# Patient Record
Sex: FEMALE | Race: WHITE | NOT HISPANIC OR LATINO
[De-identification: names, ages, dates, MRNs, and addresses within clinical notes are randomized per-mention and may not be internally consistent; named-entity substitution may affect disease eponyms.]

---

## 2017-06-12 ENCOUNTER — APPOINTMENT (OUTPATIENT)
Dept: OBGYN | Facility: CLINIC | Age: 32
End: 2017-06-12

## 2017-06-12 VITALS
SYSTOLIC BLOOD PRESSURE: 110 MMHG | DIASTOLIC BLOOD PRESSURE: 68 MMHG | HEIGHT: 68 IN | WEIGHT: 174.38 LBS | BODY MASS INDEX: 26.43 KG/M2

## 2017-06-12 DIAGNOSIS — N91.2 AMENORRHEA, UNSPECIFIED: ICD-10-CM

## 2017-06-12 DIAGNOSIS — O02.1 MISSED ABORTION: ICD-10-CM

## 2017-06-12 DIAGNOSIS — Z87.891 PERSONAL HISTORY OF NICOTINE DEPENDENCE: ICD-10-CM

## 2017-06-12 DIAGNOSIS — Z78.9 OTHER SPECIFIED HEALTH STATUS: ICD-10-CM

## 2017-06-13 ENCOUNTER — RX RENEWAL (OUTPATIENT)
Age: 32
End: 2017-06-13

## 2017-06-13 LAB
HCG UR QL: POSITIVE
QUALITY CONTROL: YES

## 2017-06-14 ENCOUNTER — MESSAGE (OUTPATIENT)
Age: 32
End: 2017-06-14

## 2017-06-14 LAB
HCG SERPL-MCNC: 8759 MIU/ML
PROGEST SERPL-MCNC: 8.8 NG/ML

## 2017-06-18 ENCOUNTER — EMERGENCY (EMERGENCY)
Facility: HOSPITAL | Age: 32
LOS: 0 days | Discharge: HOME | End: 2017-06-18
Attending: EMERGENCY MEDICINE | Admitting: EMERGENCY MEDICINE
Payer: COMMERCIAL

## 2017-06-18 VITALS — WEIGHT: 169.98 LBS | HEIGHT: 68 IN

## 2017-06-18 VITALS
RESPIRATION RATE: 19 BRPM | SYSTOLIC BLOOD PRESSURE: 110 MMHG | HEART RATE: 86 BPM | OXYGEN SATURATION: 99 % | DIASTOLIC BLOOD PRESSURE: 77 MMHG

## 2017-06-18 DIAGNOSIS — O20.0 THREATENED ABORTION: ICD-10-CM

## 2017-06-18 DIAGNOSIS — Z3A.01 LESS THAN 8 WEEKS GESTATION OF PREGNANCY: ICD-10-CM

## 2017-06-18 DIAGNOSIS — N93.9 ABNORMAL UTERINE AND VAGINAL BLEEDING, UNSPECIFIED: ICD-10-CM

## 2017-06-18 LAB
ALBUMIN SERPL ELPH-MCNC: 4 G/DL — SIGNIFICANT CHANGE UP (ref 3.3–5)
ALP SERPL-CCNC: 45 U/L — SIGNIFICANT CHANGE UP (ref 40–120)
ALT FLD-CCNC: 25 U/L — SIGNIFICANT CHANGE UP (ref 12–78)
ANION GAP SERPL CALC-SCNC: 5 MMOL/L — SIGNIFICANT CHANGE UP (ref 5–17)
APPEARANCE UR: CLEAR — SIGNIFICANT CHANGE UP
AST SERPL-CCNC: 14 U/L — LOW (ref 15–37)
BACTERIA # UR AUTO: NEGATIVE — SIGNIFICANT CHANGE UP
BASOPHILS # BLD AUTO: 0.1 K/UL — SIGNIFICANT CHANGE UP (ref 0–0.2)
BASOPHILS NFR BLD AUTO: 1 % — SIGNIFICANT CHANGE UP (ref 0–2)
BILIRUB SERPL-MCNC: 0.6 MG/DL — SIGNIFICANT CHANGE UP (ref 0.2–1.2)
BILIRUB UR-MCNC: NEGATIVE — SIGNIFICANT CHANGE UP
BLD GP AB SCN SERPL QL: SIGNIFICANT CHANGE UP
BUN SERPL-MCNC: 13 MG/DL — SIGNIFICANT CHANGE UP (ref 7–23)
CALCIUM SERPL-MCNC: 9.4 MG/DL — SIGNIFICANT CHANGE UP (ref 8.5–10.1)
CHLORIDE SERPL-SCNC: 107 MMOL/L — SIGNIFICANT CHANGE UP (ref 96–108)
CO2 SERPL-SCNC: 27 MMOL/L — SIGNIFICANT CHANGE UP (ref 22–31)
COLOR SPEC: YELLOW — SIGNIFICANT CHANGE UP
CREAT SERPL-MCNC: 0.66 MG/DL — SIGNIFICANT CHANGE UP (ref 0.5–1.3)
DIFF PNL FLD: (no result)
EOSINOPHIL # BLD AUTO: 0.1 K/UL — SIGNIFICANT CHANGE UP (ref 0–0.5)
EOSINOPHIL NFR BLD AUTO: 2.3 % — SIGNIFICANT CHANGE UP (ref 0–6)
EPI CELLS # UR: NEGATIVE — SIGNIFICANT CHANGE UP
GLUCOSE SERPL-MCNC: 87 MG/DL — SIGNIFICANT CHANGE UP (ref 70–99)
GLUCOSE UR QL: NEGATIVE MG/DL — SIGNIFICANT CHANGE UP
HCG SERPL-ACNC: SIGNIFICANT CHANGE UP MIU/ML
HCT VFR BLD CALC: 42 % — SIGNIFICANT CHANGE UP (ref 34.5–45)
HGB BLD-MCNC: 14.5 G/DL — SIGNIFICANT CHANGE UP (ref 11.5–15.5)
KETONES UR-MCNC: NEGATIVE — SIGNIFICANT CHANGE UP
LEUKOCYTE ESTERASE UR-ACNC: NEGATIVE — SIGNIFICANT CHANGE UP
LYMPHOCYTES # BLD AUTO: 1.7 K/UL — SIGNIFICANT CHANGE UP (ref 1–3.3)
LYMPHOCYTES # BLD AUTO: 26.7 % — SIGNIFICANT CHANGE UP (ref 13–44)
MCHC RBC-ENTMCNC: 32.2 PG — SIGNIFICANT CHANGE UP (ref 27–34)
MCHC RBC-ENTMCNC: 34.4 GM/DL — SIGNIFICANT CHANGE UP (ref 32–36)
MCV RBC AUTO: 93.4 FL — SIGNIFICANT CHANGE UP (ref 80–100)
MONOCYTES # BLD AUTO: 0.7 K/UL — SIGNIFICANT CHANGE UP (ref 0–0.9)
MONOCYTES NFR BLD AUTO: 10.7 % — SIGNIFICANT CHANGE UP (ref 2–14)
NEUTROPHILS # BLD AUTO: 3.8 K/UL — SIGNIFICANT CHANGE UP (ref 1.8–7.4)
NEUTROPHILS NFR BLD AUTO: 59.4 % — SIGNIFICANT CHANGE UP (ref 43–77)
NITRITE UR-MCNC: NEGATIVE — SIGNIFICANT CHANGE UP
PH UR: 6 — SIGNIFICANT CHANGE UP (ref 5–8)
PLATELET # BLD AUTO: 207 K/UL — SIGNIFICANT CHANGE UP (ref 150–400)
POTASSIUM SERPL-MCNC: 3.9 MMOL/L — SIGNIFICANT CHANGE UP (ref 3.5–5.3)
POTASSIUM SERPL-SCNC: 3.9 MMOL/L — SIGNIFICANT CHANGE UP (ref 3.5–5.3)
PROT SERPL-MCNC: 7.2 GM/DL — SIGNIFICANT CHANGE UP (ref 6–8.3)
PROT UR-MCNC: NEGATIVE MG/DL — SIGNIFICANT CHANGE UP
RBC # BLD: 4.5 M/UL — SIGNIFICANT CHANGE UP (ref 3.8–5.2)
RBC # FLD: 11.1 % — SIGNIFICANT CHANGE UP (ref 10.3–14.5)
RBC CASTS # UR COMP ASSIST: SIGNIFICANT CHANGE UP /HPF (ref 0–4)
SODIUM SERPL-SCNC: 139 MMOL/L — SIGNIFICANT CHANGE UP (ref 135–145)
SP GR SPEC: 1.01 — SIGNIFICANT CHANGE UP (ref 1.01–1.02)
TYPE + AB SCN PNL BLD: SIGNIFICANT CHANGE UP
UROBILINOGEN FLD QL: NEGATIVE MG/DL — SIGNIFICANT CHANGE UP
WBC # BLD: 6.4 K/UL — SIGNIFICANT CHANGE UP (ref 3.8–10.5)
WBC # FLD AUTO: 6.4 K/UL — SIGNIFICANT CHANGE UP (ref 3.8–10.5)
WBC UR QL: NEGATIVE — SIGNIFICANT CHANGE UP

## 2017-06-18 PROCEDURE — 76830 TRANSVAGINAL US NON-OB: CPT | Mod: 26

## 2017-06-18 PROCEDURE — 99285 EMERGENCY DEPT VISIT HI MDM: CPT

## 2017-06-18 RX ORDER — SODIUM CHLORIDE 9 MG/ML
1000 INJECTION INTRAMUSCULAR; INTRAVENOUS; SUBCUTANEOUS ONCE
Qty: 0 | Refills: 0 | Status: COMPLETED | OUTPATIENT
Start: 2017-06-18 | End: 2017-06-18

## 2017-06-18 RX ORDER — SODIUM CHLORIDE 9 MG/ML
3 INJECTION INTRAMUSCULAR; INTRAVENOUS; SUBCUTANEOUS ONCE
Qty: 0 | Refills: 0 | Status: DISCONTINUED | OUTPATIENT
Start: 2017-06-18 | End: 2017-06-18

## 2017-06-18 RX ADMIN — SODIUM CHLORIDE 1000 MILLILITER(S): 9 INJECTION INTRAMUSCULAR; INTRAVENOUS; SUBCUTANEOUS at 14:00

## 2017-06-18 RX ADMIN — SODIUM CHLORIDE 1000 MILLILITER(S): 9 INJECTION INTRAMUSCULAR; INTRAVENOUS; SUBCUTANEOUS at 13:00

## 2017-06-18 NOTE — ED STATDOCS - OBJECTIVE STATEMENT
32 y/o F G6-7? presents to ED for evaluation of vaginal bleeding. Pt states today she experienced abd pain with what she reports is significant bleeding from her vagina. Pt states she has an abnormal uterus and that she has had multiple miscarriages. She states the location of her pregnancy is in the left horn. No recent intercourse. No fever, chills. 32 y/o F G6-7? presents to ED for evaluation of vaginal bleeding. Pt states today she experienced abd pain with what she reports is significant bleeding from her vagina. Pt states she has an abnormal uterus and that she has had multiple miscarriages. She states the location of her pregnancy is in the left horn. No recent intercourse. No fever, chills.  GYN:  Reg

## 2017-06-18 NOTE — ED STATDOCS - PROGRESS NOTE DETAILS
ERIK Wiggins:   Patient has been seen, evaluated and orders have been written by the attending in intake. Patient is stable.  I will follow up the results of orders written and I will continue to evaluate/observe the patient.  Pt. states she soaked through one pad.    Paige Wiggins PA-C Pt. known RH-. I spoke with Dr. Alfonso and she advised to give Rhogam.  Paige Wiggins PA-C I tried to reach Dr. Finney and Dr. Edwards is covering.  Agreed that sono results showed probable threatened AB due to low FHR.  Sterile Bimanual shows slight opening to OS with blood in vaginal vault.  Pt. instructed to follow up with Dr. Finney tomorrow.  Understand she may return for any worsening symptoms.  Paige Wiggins PA-C I tried to reach Dr. Finney and Dr. Edwards is covering.  Agreed that sono results showed probable threatened AB due to low FHR.  Agreed to administer Rhogam.  Sterile Bimanual shows slight opening to OS with blood in vaginal vault.  Pt. instructed to follow up with Dr. Finney tomorrow.  Understand she may return for any worsening symptoms.  Paige Wiggins PA-C

## 2017-06-18 NOTE — ED STATDOCS - ATTENDING CONTRIBUTION TO CARE
Dr. Heath: I performed the initial face to face bedside interview with this patient regarding history of present illness, review of symptoms and past medical, social and family history.  I completed an independent physical examination.  I was the initial provider who evaluated this patient.  The history, review of symptoms and examination was documented by the scribe in my presence and I attest to the accuracy of the documentation.  I have signed out the follow up of any pending tests (i.e. labs, radiological studies) to the ACP.  I have discussed the patient’s plan of care and disposition with the ACP.  Return to the ER immediately for any worsening symptoms, concerns, chest pain, fevers, shortness of breath, vomiting, abdominal pain, rashes, neck pain, back pain, numbness, paresthesias, pain or any difficulties at all.  Please follow up with your own private physician or our medical clinic at [phone] in the next 2-3 days.  Find a doctor at [phone].

## 2017-06-18 NOTE — ED ADULT NURSE REASSESSMENT NOTE - NS ED NURSE REASSESS COMMENT FT1
pt reports feeling increased lower abdominal pain followed by a "gush of chunky blood", now feeling better, pain decreased, maxi pad changed for comfort, VS as charted, awaiting RhoGham injection from blood bank

## 2017-06-18 NOTE — ED ADULT NURSE NOTE - OBJECTIVE STATEMENT
pt reports heavy, period like vaginal bleeding since 11am with intermittent cramping.  pt reports she is 5 weeks pregnant and has had a confirmatory sonogram @ [facility]

## 2017-06-19 ENCOUNTER — APPOINTMENT (OUTPATIENT)
Dept: OBGYN | Facility: CLINIC | Age: 32
End: 2017-06-19

## 2017-06-19 ENCOUNTER — APPOINTMENT (OUTPATIENT)
Dept: DERMATOLOGY | Facility: CLINIC | Age: 32
End: 2017-06-19

## 2017-06-19 VITALS
SYSTOLIC BLOOD PRESSURE: 110 MMHG | WEIGHT: 174 LBS | DIASTOLIC BLOOD PRESSURE: 70 MMHG | BODY MASS INDEX: 26.37 KG/M2 | HEIGHT: 68 IN

## 2017-06-19 DIAGNOSIS — O34.01 MATERNAL CARE FOR UNSPECIFIED CONGENITAL MALFORMATION OF UTERUS, FIRST TRIMESTER: ICD-10-CM

## 2017-06-19 DIAGNOSIS — Q51.3 MATERNAL CARE FOR UNSPECIFIED CONGENITAL MALFORMATION OF UTERUS, FIRST TRIMESTER: ICD-10-CM

## 2017-06-19 LAB
CULTURE RESULTS: NO GROWTH — SIGNIFICANT CHANGE UP
SPECIMEN SOURCE: SIGNIFICANT CHANGE UP

## 2017-06-20 LAB — PROGEST SERPL-MCNC: 14.1 NG/ML

## 2017-06-25 ENCOUNTER — RESULT CHARGE (OUTPATIENT)
Age: 32
End: 2017-06-25

## 2017-06-26 ENCOUNTER — LABORATORY RESULT (OUTPATIENT)
Age: 32
End: 2017-06-26

## 2017-06-26 ENCOUNTER — APPOINTMENT (OUTPATIENT)
Dept: OBGYN | Facility: CLINIC | Age: 32
End: 2017-06-26

## 2017-06-26 VITALS
WEIGHT: 174 LBS | DIASTOLIC BLOOD PRESSURE: 70 MMHG | SYSTOLIC BLOOD PRESSURE: 114 MMHG | BODY MASS INDEX: 26.37 KG/M2 | HEIGHT: 68 IN | HEART RATE: 74 BPM | RESPIRATION RATE: 18 BRPM

## 2017-06-26 DIAGNOSIS — R79.89 OTHER SPECIFIED ABNORMAL FINDINGS OF BLOOD CHEMISTRY: ICD-10-CM

## 2017-06-26 DIAGNOSIS — O00.10 TUBAL PREGNANCY/ WITHOUT INTRAUTERINE PREGNANCY: ICD-10-CM

## 2017-06-26 RX ORDER — PROGESTERONE 90 MG/1.125G
8 GEL VAGINAL DAILY
Qty: 30 | Refills: 1 | Status: DISCONTINUED | COMMUNITY
Start: 2017-06-12 | End: 2017-06-26

## 2017-06-26 RX ORDER — ASPIRIN 81 MG
81 TABLET, DELAYED RELEASE (ENTERIC COATED) ORAL
Refills: 0 | Status: ACTIVE | COMMUNITY

## 2017-06-27 LAB
ABO + RH PNL BLD: NORMAL
APPEARANCE: ABNORMAL
BACTERIA: ABNORMAL
BASOPHILS # BLD AUTO: 0.04 K/UL
BASOPHILS NFR BLD AUTO: 0.5 %
BILIRUB UR QL STRIP: NEGATIVE
BILIRUBIN URINE: NEGATIVE
BLOOD URINE: NEGATIVE
CLARITY UR: NORMAL
COLLECTION METHOD: NORMAL
COLOR: ABNORMAL
EOSINOPHIL # BLD AUTO: 0.1 K/UL
EOSINOPHIL NFR BLD AUTO: 1.2 %
GLUCOSE QUALITATIVE U: NORMAL MG/DL
GLUCOSE UR-MCNC: NEGATIVE
HCG UR QL: 1 EU/DL
HCT VFR BLD CALC: 41 %
HGB BLD-MCNC: 13.5 G/DL
HGB UR QL STRIP.AUTO: NEGATIVE
HIV1+2 AB SPEC QL IA.RAPID: NONREACTIVE
HYALINE CASTS: 0 /LPF
IMM GRANULOCYTES NFR BLD AUTO: 0.1 %
KETONES UR-MCNC: NEGATIVE
KETONES URINE: ABNORMAL
LEUKOCYTE ESTERASE UR QL STRIP: NEGATIVE
LEUKOCYTE ESTERASE URINE: NEGATIVE
LYMPHOCYTES # BLD AUTO: 1.62 K/UL
LYMPHOCYTES NFR BLD AUTO: 20.2 %
MAN DIFF?: NORMAL
MCHC RBC-ENTMCNC: 31 PG
MCHC RBC-ENTMCNC: 32.9 GM/DL
MCV RBC AUTO: 94.3 FL
MICROSCOPIC-UA: NORMAL
MONOCYTES # BLD AUTO: 0.81 K/UL
MONOCYTES NFR BLD AUTO: 10.1 %
NEUTROPHILS # BLD AUTO: 5.44 K/UL
NEUTROPHILS NFR BLD AUTO: 67.9 %
NITRITE UR QL STRIP: NEGATIVE
NITRITE URINE: NEGATIVE
PH UR STRIP: 5
PH URINE: 5.5
PLATELET # BLD AUTO: 228 K/UL
PROT UR STRIP-MCNC: NORMAL
PROTEIN URINE: ABNORMAL MG/DL
RBC # BLD: 4.35 M/UL
RBC # FLD: 12.3 %
RED BLOOD CELLS URINE: 3 /HPF
SP GR UR STRIP: >=1.03
SPECIFIC GRAVITY URINE: 1.04
SQUAMOUS EPITHELIAL CELLS: 3 /HPF
URINE COMMENTS: NORMAL
UROBILINOGEN URINE: 1 MG/DL
WBC # FLD AUTO: 8.02 K/UL
WHITE BLOOD CELLS URINE: 3 /HPF

## 2017-06-30 LAB
BACTERIA GENITAL AEROBE CULT: ABNORMAL
BACTERIA UR CULT: NORMAL
BILIRUB UR QL STRIP: NEGATIVE
BLD GP AB SCN SERPL QL: ABNORMAL
C TRACH RRNA SPEC QL NAA+PROBE: NORMAL
CANDIDA VAG CYTO: NOT DETECTED
CMV IGG SERPL QL: <0.2 U/ML
CMV IGG SERPL-IMP: NEGATIVE
CMV IGM SERPL QL: <8 AU/ML
CMV IGM SERPL QL: NEGATIVE
CYTOLOGY CVX/VAG DOC THIN PREP: NORMAL
FMR1 GENE MUT ANL BLD/T: NORMAL
G VAGINALIS+PREV SP MTYP VAG QL MICRO: DETECTED
GLUCOSE UR-MCNC: NEGATIVE
HBV SURFACE AG SER QL: NONREACTIVE
HCG UR QL: 1 EU/DL
HCV AB SER QL: NONREACTIVE
HCV S/CO RATIO: 0.09 S/CO
HGB A MFR BLD: 97.3 %
HGB A2 MFR BLD: 2.7 %
HGB FRACT BLD-IMP: NORMAL
HGB UR QL STRIP.AUTO: NEGATIVE
HPV HIGH+LOW RISK DNA PNL CVX: NEGATIVE
KETONES UR-MCNC: NEGATIVE
LEUKOCYTE ESTERASE UR QL STRIP: NEGATIVE
MEV IGG FLD QL IA: >300 AU/ML
MEV IGG+IGM SER-IMP: POSITIVE
N GONORRHOEA RRNA SPEC QL NAA+PROBE: NORMAL
NITRITE UR QL STRIP: NEGATIVE
PH UR STRIP: 5
PROT UR STRIP-MCNC: NORMAL
RUBV IGG FLD-ACNC: 2.5 INDEX
RUBV IGG SER-IMP: POSITIVE
RUBV IGM FLD-ACNC: <20 AU/ML
SOURCE TP AMPLIFICATION: NORMAL
SP GR UR STRIP: >=1.03
T GONDII AB SER-IMP: NEGATIVE
T GONDII AB SER-IMP: NEGATIVE
T GONDII IGG SER QL: <3 IU/ML
T GONDII IGM SER QL: <3 AU/ML
T PALLIDUM AB SER QL IA: NEGATIVE
T VAGINALIS VAG QL WET PREP: NOT DETECTED
T4 SERPL-MCNC: 7.1 UG/DL
TSH SERPL-ACNC: 1.28 UIU/ML
VZV AB TITR SER: POSITIVE
VZV IGG SER IF-ACNC: 950.3 INDEX

## 2017-06-30 RX ORDER — METRONIDAZOLE 375 MG/1
375 CAPSULE ORAL
Qty: 14 | Refills: 0 | Status: ACTIVE | COMMUNITY
Start: 2017-06-30 | End: 1900-01-01

## 2017-07-06 LAB
AR GENE MUT ANL BLD/T: NORMAL
CFTR MUT TESTED BLD/T: NORMAL

## 2017-07-07 ENCOUNTER — MESSAGE (OUTPATIENT)
Age: 32
End: 2017-07-07

## 2017-07-07 ENCOUNTER — CLINICAL ADVICE (OUTPATIENT)
Age: 32
End: 2017-07-07

## 2017-07-10 ENCOUNTER — CLINICAL ADVICE (OUTPATIENT)
Age: 32
End: 2017-07-10

## 2017-07-11 ENCOUNTER — APPOINTMENT (OUTPATIENT)
Dept: OBGYN | Facility: CLINIC | Age: 32
End: 2017-07-11

## 2017-07-11 VITALS
HEIGHT: 68 IN | DIASTOLIC BLOOD PRESSURE: 66 MMHG | HEART RATE: 74 BPM | WEIGHT: 172 LBS | SYSTOLIC BLOOD PRESSURE: 108 MMHG | BODY MASS INDEX: 26.07 KG/M2 | RESPIRATION RATE: 18 BRPM

## 2017-07-11 DIAGNOSIS — Q51.3 MATERNAL CARE FOR UNSPECIFIED CONGENITAL MALFORMATION OF UTERUS, FIRST TRIMESTER: ICD-10-CM

## 2017-07-11 DIAGNOSIS — O34.01 MATERNAL CARE FOR UNSPECIFIED CONGENITAL MALFORMATION OF UTERUS, FIRST TRIMESTER: ICD-10-CM

## 2017-07-14 ENCOUNTER — ASOB RESULT (OUTPATIENT)
Age: 32
End: 2017-07-14

## 2017-07-14 ENCOUNTER — APPOINTMENT (OUTPATIENT)
Dept: ANTEPARTUM | Facility: CLINIC | Age: 32
End: 2017-07-14

## 2017-07-25 ENCOUNTER — ASOB RESULT (OUTPATIENT)
Age: 32
End: 2017-07-25

## 2017-07-25 ENCOUNTER — APPOINTMENT (OUTPATIENT)
Dept: ANTEPARTUM | Facility: CLINIC | Age: 32
End: 2017-07-25

## 2017-07-28 ENCOUNTER — APPOINTMENT (OUTPATIENT)
Dept: ANTEPARTUM | Facility: CLINIC | Age: 32
End: 2017-07-28
Payer: COMMERCIAL

## 2017-07-28 ENCOUNTER — ASOB RESULT (OUTPATIENT)
Age: 32
End: 2017-07-28

## 2017-07-28 PROCEDURE — 36415 COLL VENOUS BLD VENIPUNCTURE: CPT

## 2017-07-28 PROCEDURE — 36416 COLLJ CAPILLARY BLOOD SPEC: CPT

## 2017-07-28 PROCEDURE — 76813 OB US NUCHAL MEAS 1 GEST: CPT

## 2017-08-01 ENCOUNTER — APPOINTMENT (OUTPATIENT)
Dept: OBGYN | Facility: CLINIC | Age: 32
End: 2017-08-01
Payer: COMMERCIAL

## 2017-08-01 ENCOUNTER — NON-APPOINTMENT (OUTPATIENT)
Age: 32
End: 2017-08-01

## 2017-08-01 VITALS
BODY MASS INDEX: 26.52 KG/M2 | WEIGHT: 175 LBS | HEIGHT: 68 IN | SYSTOLIC BLOOD PRESSURE: 98 MMHG | DIASTOLIC BLOOD PRESSURE: 70 MMHG

## 2017-08-01 LAB
BILIRUB UR QL STRIP: NORMAL
CLARITY UR: CLEAR
COLLECTION METHOD: NORMAL
GLUCOSE UR-MCNC: NORMAL
HCG UR QL: 0.2 EU/DL
HGB UR QL STRIP.AUTO: NORMAL
KETONES UR-MCNC: NORMAL
LEUKOCYTE ESTERASE UR QL STRIP: NORMAL
NITRITE UR QL STRIP: NORMAL
PH UR STRIP: 7
PROT UR STRIP-MCNC: NORMAL
SP GR UR STRIP: 1.02

## 2017-08-01 PROCEDURE — 0502F SUBSEQUENT PRENATAL CARE: CPT

## 2017-08-01 PROCEDURE — 81003 URINALYSIS AUTO W/O SCOPE: CPT | Mod: QW

## 2017-08-15 ENCOUNTER — MESSAGE (OUTPATIENT)
Age: 32
End: 2017-08-15

## 2017-08-15 ENCOUNTER — RESULT CHARGE (OUTPATIENT)
Age: 32
End: 2017-08-15

## 2017-08-15 ENCOUNTER — NON-APPOINTMENT (OUTPATIENT)
Age: 32
End: 2017-08-15

## 2017-08-15 ENCOUNTER — APPOINTMENT (OUTPATIENT)
Dept: OBGYN | Facility: CLINIC | Age: 32
End: 2017-08-15
Payer: COMMERCIAL

## 2017-08-15 VITALS
BODY MASS INDEX: 26.67 KG/M2 | WEIGHT: 176 LBS | DIASTOLIC BLOOD PRESSURE: 74 MMHG | SYSTOLIC BLOOD PRESSURE: 112 MMHG | HEIGHT: 68 IN

## 2017-08-15 PROCEDURE — 0502F SUBSEQUENT PRENATAL CARE: CPT

## 2017-08-16 LAB
BILIRUB UR QL STRIP: NORMAL
CLARITY UR: CLEAR
COLLECTION METHOD: NORMAL
GLUCOSE UR-MCNC: NORMAL
HCG UR QL: 0.2 EU/DL
HGB UR QL STRIP.AUTO: NORMAL
KETONES UR-MCNC: NORMAL
LEUKOCYTE ESTERASE UR QL STRIP: NORMAL
NITRITE UR QL STRIP: NORMAL
PH UR STRIP: 7
PROT UR STRIP-MCNC: NORMAL
SP GR UR STRIP: 1.01

## 2017-08-25 ENCOUNTER — ASOB RESULT (OUTPATIENT)
Age: 32
End: 2017-08-25

## 2017-08-25 ENCOUNTER — APPOINTMENT (OUTPATIENT)
Dept: ANTEPARTUM | Facility: CLINIC | Age: 32
End: 2017-08-25
Payer: COMMERCIAL

## 2017-08-25 PROCEDURE — 76817 TRANSVAGINAL US OBSTETRIC: CPT

## 2017-08-25 PROCEDURE — 76805 OB US >/= 14 WKS SNGL FETUS: CPT

## 2017-08-29 ENCOUNTER — LABORATORY RESULT (OUTPATIENT)
Age: 32
End: 2017-08-29

## 2017-08-30 ENCOUNTER — APPOINTMENT (OUTPATIENT)
Dept: OBGYN | Facility: CLINIC | Age: 32
End: 2017-08-30
Payer: COMMERCIAL

## 2017-08-30 PROCEDURE — 0502F SUBSEQUENT PRENATAL CARE: CPT

## 2017-09-01 ENCOUNTER — NON-APPOINTMENT (OUTPATIENT)
Age: 32
End: 2017-09-01

## 2017-09-08 ENCOUNTER — APPOINTMENT (OUTPATIENT)
Dept: ANTEPARTUM | Facility: CLINIC | Age: 32
End: 2017-09-08
Payer: COMMERCIAL

## 2017-09-08 ENCOUNTER — ASOB RESULT (OUTPATIENT)
Age: 32
End: 2017-09-08

## 2017-09-08 PROCEDURE — 76817 TRANSVAGINAL US OBSTETRIC: CPT

## 2017-09-27 ENCOUNTER — MED ADMIN CHARGE (OUTPATIENT)
Age: 32
End: 2017-09-27

## 2017-09-27 ENCOUNTER — APPOINTMENT (OUTPATIENT)
Dept: OBGYN | Facility: CLINIC | Age: 32
End: 2017-09-27
Payer: COMMERCIAL

## 2017-09-27 ENCOUNTER — ASOB RESULT (OUTPATIENT)
Age: 32
End: 2017-09-27

## 2017-09-27 ENCOUNTER — APPOINTMENT (OUTPATIENT)
Dept: ANTEPARTUM | Facility: CLINIC | Age: 32
End: 2017-09-27
Payer: COMMERCIAL

## 2017-09-27 VITALS — BODY MASS INDEX: 27.37 KG/M2 | WEIGHT: 180 LBS | SYSTOLIC BLOOD PRESSURE: 105 MMHG | DIASTOLIC BLOOD PRESSURE: 70 MMHG

## 2017-09-27 PROCEDURE — 90656 IIV3 VACC NO PRSV 0.5 ML IM: CPT

## 2017-09-27 PROCEDURE — 76811 OB US DETAILED SNGL FETUS: CPT

## 2017-09-27 PROCEDURE — 0502F SUBSEQUENT PRENATAL CARE: CPT

## 2017-09-27 PROCEDURE — G0008: CPT

## 2017-09-27 PROCEDURE — 96372 THER/PROPH/DIAG INJ SC/IM: CPT

## 2017-10-05 ENCOUNTER — RESULT REVIEW (OUTPATIENT)
Age: 32
End: 2017-10-05

## 2017-10-05 DIAGNOSIS — Z3A.22 22 WEEKS GESTATION OF PREGNANCY: ICD-10-CM

## 2017-10-05 DIAGNOSIS — Z34.90 ENCOUNTER FOR SUPERVISION OF NORMAL PREGNANCY, UNSPECIFIED, UNSPECIFIED TRIMESTER: ICD-10-CM

## 2017-10-05 DIAGNOSIS — Z3A.17 17 WEEKS GESTATION OF PREGNANCY: ICD-10-CM

## 2017-10-05 DIAGNOSIS — Z87.59 PERSONAL HISTORY OF OTHER COMPLICATIONS OF PREGNANCY, CHILDBIRTH AND THE PUERPERIUM: ICD-10-CM

## 2017-10-11 ENCOUNTER — APPOINTMENT (OUTPATIENT)
Dept: OBGYN | Facility: CLINIC | Age: 32
End: 2017-10-11
Payer: COMMERCIAL

## 2017-10-11 VITALS
HEIGHT: 68 IN | SYSTOLIC BLOOD PRESSURE: 115 MMHG | WEIGHT: 185 LBS | DIASTOLIC BLOOD PRESSURE: 70 MMHG | BODY MASS INDEX: 28.04 KG/M2

## 2017-10-11 PROCEDURE — 0502F SUBSEQUENT PRENATAL CARE: CPT

## 2017-10-11 PROCEDURE — 36415 COLL VENOUS BLD VENIPUNCTURE: CPT

## 2017-10-13 ENCOUNTER — MESSAGE (OUTPATIENT)
Age: 32
End: 2017-10-13

## 2017-10-13 ENCOUNTER — APPOINTMENT (OUTPATIENT)
Dept: OBGYN | Facility: CLINIC | Age: 32
End: 2017-10-13
Payer: COMMERCIAL

## 2017-10-13 VITALS
BODY MASS INDEX: 28.04 KG/M2 | WEIGHT: 185 LBS | DIASTOLIC BLOOD PRESSURE: 70 MMHG | SYSTOLIC BLOOD PRESSURE: 110 MMHG | HEIGHT: 68 IN

## 2017-10-13 PROCEDURE — 83986 ASSAY PH BODY FLUID NOS: CPT | Mod: QW

## 2017-10-13 PROCEDURE — 82120 AMINES VAGINAL FLUID QUAL: CPT | Mod: QW

## 2017-10-13 PROCEDURE — 0502F SUBSEQUENT PRENATAL CARE: CPT

## 2017-10-16 ENCOUNTER — RESULT REVIEW (OUTPATIENT)
Age: 32
End: 2017-10-16

## 2017-10-16 LAB
BACTERIA GENITAL AEROBE CULT: NORMAL
BASOPHILS # BLD AUTO: 0.02 K/UL
BASOPHILS NFR BLD AUTO: 0.2 %
BLD GP AB SCN SERPL QL: NORMAL
CANDIDA VAG CYTO: NOT DETECTED
EOSINOPHIL # BLD AUTO: 0.09 K/UL
EOSINOPHIL NFR BLD AUTO: 1.1 %
G VAGINALIS+PREV SP MTYP VAG QL MICRO: NOT DETECTED
GLUCOSE 1H P 50 G GLC PO SERPL-MCNC: 104 MG/DL
HCT VFR BLD CALC: 37.4 %
HGB BLD-MCNC: 11.7 G/DL
IMM GRANULOCYTES NFR BLD AUTO: 0.4 %
LYMPHOCYTES # BLD AUTO: 1.58 K/UL
LYMPHOCYTES NFR BLD AUTO: 19 %
MAN DIFF?: NORMAL
MCHC RBC-ENTMCNC: 31 PG
MCHC RBC-ENTMCNC: 31.3 GM/DL
MCV RBC AUTO: 99.2 FL
MONOCYTES # BLD AUTO: 0.6 K/UL
MONOCYTES NFR BLD AUTO: 7.2 %
NEUTROPHILS # BLD AUTO: 6 K/UL
NEUTROPHILS NFR BLD AUTO: 72.1 %
PLATELET # BLD AUTO: 201 K/UL
RBC # BLD: 3.77 M/UL
RBC # FLD: 13.7 %
T VAGINALIS VAG QL WET PREP: NOT DETECTED
WBC # FLD AUTO: 8.32 K/UL

## 2017-10-25 ENCOUNTER — APPOINTMENT (OUTPATIENT)
Dept: OBGYN | Facility: CLINIC | Age: 32
End: 2017-10-25
Payer: COMMERCIAL

## 2017-10-25 VITALS
HEIGHT: 68 IN | WEIGHT: 192 LBS | DIASTOLIC BLOOD PRESSURE: 70 MMHG | SYSTOLIC BLOOD PRESSURE: 110 MMHG | BODY MASS INDEX: 29.1 KG/M2

## 2017-10-25 DIAGNOSIS — Q51.3 MATERNAL CARE FOR UNSPECIFIED CONGENITAL MALFORMATION OF UTERUS, FIRST TRIMESTER: ICD-10-CM

## 2017-10-25 DIAGNOSIS — O34.01 MATERNAL CARE FOR UNSPECIFIED CONGENITAL MALFORMATION OF UTERUS, FIRST TRIMESTER: ICD-10-CM

## 2017-10-25 PROCEDURE — 0502F SUBSEQUENT PRENATAL CARE: CPT

## 2017-10-25 PROCEDURE — 81003 URINALYSIS AUTO W/O SCOPE: CPT | Mod: QW

## 2017-10-26 LAB
BILIRUB UR QL STRIP: NORMAL
CLARITY UR: CLEAR
COLLECTION METHOD: NORMAL
GLUCOSE UR-MCNC: NORMAL
HCG UR QL: 1 EU/DL
HGB UR QL STRIP.AUTO: NORMAL
KETONES UR-MCNC: NORMAL
LEUKOCYTE ESTERASE UR QL STRIP: NORMAL
NITRITE UR QL STRIP: NORMAL
PH UR STRIP: 7
PROT UR STRIP-MCNC: NORMAL
SP GR UR STRIP: 1.02

## 2017-11-07 ENCOUNTER — RESULT CHARGE (OUTPATIENT)
Age: 32
End: 2017-11-07

## 2017-11-08 ENCOUNTER — NON-APPOINTMENT (OUTPATIENT)
Age: 32
End: 2017-11-08

## 2017-11-08 ENCOUNTER — APPOINTMENT (OUTPATIENT)
Dept: OBGYN | Facility: CLINIC | Age: 32
End: 2017-11-08
Payer: COMMERCIAL

## 2017-11-08 VITALS
SYSTOLIC BLOOD PRESSURE: 114 MMHG | HEIGHT: 68 IN | BODY MASS INDEX: 29.86 KG/M2 | DIASTOLIC BLOOD PRESSURE: 70 MMHG | WEIGHT: 197 LBS

## 2017-11-08 DIAGNOSIS — Q51.3 MATERNAL CARE FOR UNSPECIFIED CONGENITAL MALFORMATION OF UTERUS, FIRST TRIMESTER: ICD-10-CM

## 2017-11-08 DIAGNOSIS — Z87.42 PERSONAL HISTORY OF OTHER DISEASES OF THE FEMALE GENITAL TRACT: ICD-10-CM

## 2017-11-08 DIAGNOSIS — O26.892 OTHER SPECIFIED PREGNANCY RELATED CONDITIONS, SECOND TRIMESTER: ICD-10-CM

## 2017-11-08 DIAGNOSIS — Z3A.24 24 WEEKS GESTATION OF PREGNANCY: ICD-10-CM

## 2017-11-08 DIAGNOSIS — B96.89 ACUTE VAGINITIS: ICD-10-CM

## 2017-11-08 DIAGNOSIS — O34.01 MATERNAL CARE FOR UNSPECIFIED CONGENITAL MALFORMATION OF UTERUS, FIRST TRIMESTER: ICD-10-CM

## 2017-11-08 DIAGNOSIS — Z67.91 UNSPECIFIED BLOOD TYPE, RH NEGATIVE: ICD-10-CM

## 2017-11-08 DIAGNOSIS — N89.8 OTHER SPECIFIED PREGNANCY RELATED CONDITIONS, SECOND TRIMESTER: ICD-10-CM

## 2017-11-08 DIAGNOSIS — Z09 ENCOUNTER FOR FOLLOW-UP EXAMINATION AFTER COMPLETED TREATMENT FOR CONDITIONS OTHER THAN MALIGNANT NEOPLASM: ICD-10-CM

## 2017-11-08 DIAGNOSIS — N76.0 ACUTE VAGINITIS: ICD-10-CM

## 2017-11-08 PROCEDURE — 0502F SUBSEQUENT PRENATAL CARE: CPT

## 2017-11-08 PROCEDURE — 96372 THER/PROPH/DIAG INJ SC/IM: CPT

## 2017-11-08 RX ORDER — HUMAN RHO(D) IMMUNE GLOBULIN 300 UG/1
1500 INJECTION, SOLUTION INTRAMUSCULAR
Qty: 0 | Refills: 0 | Status: COMPLETED | OUTPATIENT
Start: 2017-11-08

## 2017-11-08 RX ADMIN — HUMAN RHO(D) IMMUNE GLOBULIN 2 UNIT/2ML: 1500 SOLUTION INTRAMUSCULAR; INTRAVENOUS at 00:00

## 2017-11-08 RX ADMIN — Medication 0 UNIT: at 00:00

## 2017-11-17 ENCOUNTER — ASOB RESULT (OUTPATIENT)
Age: 32
End: 2017-11-17

## 2017-11-17 ENCOUNTER — APPOINTMENT (OUTPATIENT)
Dept: ANTEPARTUM | Facility: CLINIC | Age: 32
End: 2017-11-17
Payer: COMMERCIAL

## 2017-11-17 PROCEDURE — 76816 OB US FOLLOW-UP PER FETUS: CPT

## 2017-11-22 ENCOUNTER — APPOINTMENT (OUTPATIENT)
Dept: OBGYN | Facility: CLINIC | Age: 32
End: 2017-11-22
Payer: COMMERCIAL

## 2017-11-22 ENCOUNTER — RESULT CHARGE (OUTPATIENT)
Age: 32
End: 2017-11-22

## 2017-11-22 VITALS
BODY MASS INDEX: 30.01 KG/M2 | SYSTOLIC BLOOD PRESSURE: 110 MMHG | WEIGHT: 198 LBS | HEIGHT: 68 IN | DIASTOLIC BLOOD PRESSURE: 70 MMHG

## 2017-11-22 DIAGNOSIS — Q51.3 BICORNATE UTERUS: ICD-10-CM

## 2017-11-22 LAB
BILIRUB UR QL STRIP: NORMAL
CLARITY UR: CLEAR
COLLECTION METHOD: NORMAL
GLUCOSE UR-MCNC: NORMAL
HCG UR QL: 0.2 EU/DL
HGB UR QL STRIP.AUTO: NORMAL
KETONES UR-MCNC: NORMAL
LEUKOCYTE ESTERASE UR QL STRIP: NORMAL
NITRITE UR QL STRIP: NORMAL
PH UR STRIP: 7.5
PROT UR STRIP-MCNC: NORMAL
SP GR UR STRIP: 1.02

## 2017-11-22 PROCEDURE — 0502F SUBSEQUENT PRENATAL CARE: CPT

## 2017-12-05 ENCOUNTER — RESULT CHARGE (OUTPATIENT)
Age: 32
End: 2017-12-05

## 2017-12-06 ENCOUNTER — APPOINTMENT (OUTPATIENT)
Dept: OBGYN | Facility: CLINIC | Age: 32
End: 2017-12-06
Payer: COMMERCIAL

## 2017-12-06 VITALS
DIASTOLIC BLOOD PRESSURE: 80 MMHG | SYSTOLIC BLOOD PRESSURE: 112 MMHG | WEIGHT: 206 LBS | RESPIRATION RATE: 16 BRPM | BODY MASS INDEX: 31.22 KG/M2 | HEIGHT: 68 IN

## 2017-12-06 PROCEDURE — 90471 IMMUNIZATION ADMIN: CPT

## 2017-12-06 PROCEDURE — 81003 URINALYSIS AUTO W/O SCOPE: CPT | Mod: QW

## 2017-12-06 PROCEDURE — 90715 TDAP VACCINE 7 YRS/> IM: CPT

## 2017-12-06 PROCEDURE — 0502F SUBSEQUENT PRENATAL CARE: CPT

## 2017-12-07 LAB
BILIRUB UR QL STRIP: NORMAL
CLARITY UR: CLEAR
COLLECTION METHOD: NORMAL
GLUCOSE UR-MCNC: NORMAL
HCG UR QL: 0.2 EU/DL
HGB UR QL STRIP.AUTO: NORMAL
KETONES UR-MCNC: NORMAL
LEUKOCYTE ESTERASE UR QL STRIP: NORMAL
NITRITE UR QL STRIP: NORMAL
PH UR STRIP: 5.5
PROT UR STRIP-MCNC: NORMAL
SP GR UR STRIP: 1.02

## 2017-12-13 DIAGNOSIS — Z3A.26 26 WEEKS GESTATION OF PREGNANCY: ICD-10-CM

## 2017-12-20 ENCOUNTER — APPOINTMENT (OUTPATIENT)
Dept: OBGYN | Facility: CLINIC | Age: 32
End: 2017-12-20
Payer: COMMERCIAL

## 2017-12-20 VITALS
DIASTOLIC BLOOD PRESSURE: 80 MMHG | HEIGHT: 68 IN | BODY MASS INDEX: 31.22 KG/M2 | SYSTOLIC BLOOD PRESSURE: 118 MMHG | WEIGHT: 206 LBS

## 2017-12-20 PROCEDURE — 0502F SUBSEQUENT PRENATAL CARE: CPT

## 2017-12-26 ENCOUNTER — APPOINTMENT (OUTPATIENT)
Dept: ANTEPARTUM | Facility: CLINIC | Age: 32
End: 2017-12-26
Payer: COMMERCIAL

## 2017-12-26 ENCOUNTER — ASOB RESULT (OUTPATIENT)
Age: 32
End: 2017-12-26

## 2017-12-26 PROCEDURE — 76816 OB US FOLLOW-UP PER FETUS: CPT

## 2018-01-03 ENCOUNTER — APPOINTMENT (OUTPATIENT)
Dept: OBGYN | Facility: CLINIC | Age: 33
End: 2018-01-03
Payer: COMMERCIAL

## 2018-01-03 VITALS
DIASTOLIC BLOOD PRESSURE: 70 MMHG | WEIGHT: 211 LBS | HEIGHT: 68 IN | BODY MASS INDEX: 31.98 KG/M2 | SYSTOLIC BLOOD PRESSURE: 110 MMHG

## 2018-01-03 LAB
BILIRUB UR QL STRIP: NORMAL
CLARITY UR: CLEAR
COLLECTION METHOD: NORMAL
GLUCOSE UR-MCNC: NORMAL
HCG UR QL: 1 EU/DL
HGB UR QL STRIP.AUTO: NORMAL
KETONES UR-MCNC: NORMAL
LEUKOCYTE ESTERASE UR QL STRIP: NORMAL
NITRITE UR QL STRIP: NORMAL
PH UR STRIP: 5.5
PROT UR STRIP-MCNC: NORMAL
SP GR UR STRIP: 1.03

## 2018-01-03 PROCEDURE — 81003 URINALYSIS AUTO W/O SCOPE: CPT | Mod: QW

## 2018-01-03 PROCEDURE — 76818 FETAL BIOPHYS PROFILE W/NST: CPT

## 2018-01-09 ENCOUNTER — RESULT CHARGE (OUTPATIENT)
Age: 33
End: 2018-01-09

## 2018-01-09 NOTE — MISCELLANEOUS
Message   Recorded as Task  Date: 04/04/2016 12:07 PM, Created By: Grace Rios  Task Name: Medical Complaint Callback  Assigned To: Jason Greenfield  Regarding Patient: Julius Cowan, Status: In Progress  Jorge Cady - 04 Apr 2016 12:07 PM    TASK CREATED  Caller: Self; Medical Complaint; [phone] (Home)  Patient is scheduled as a NP on 4/11, follow up to ER  She left a message that she went to ER because of bleeding, still bleeding, passing clots and cramping  Her message was hard to understand  Álvaro Luxee - 04 Apr 2016 3:14 PM    TASK REPLIED TO: Previously Assigned To [facility],Team  p/c to pt still bleeding heavy and passing clots  I instructed pt to go back to ER  We prefer [facility] but if pt can not get there due to transportation [place]  PT was instructed to call ofc tomorrow   Vonnie Lux - 04 Apr 2016 3:14 PM    TASK IN PROGRESS  Olivia Fountain - 05 Apr 2016 8:45 AM    TASK EDITED  pts mother called pt is still bleeding the mother wants a call back @ [phone]  Vonnie Lux - 05 Apr 2016 10:27 AM    TASK REPLIED TO: Previously Assigned To [facility],Team  lmom to [employer] - 05 Apr 2016 10:27 AM    TASK IN PROGRESS  Vonnie Lux - 05 Apr 2016 2:34 PM    TASK REPLIED TO: Previously Assigned To [facility],Team  p/c to pt she and her mother are very concerned about her pregnancy and all the bleeding  We have recom pt go to ER x2  Pt went to [facility]  Pt was seen last evening  U/S IUP w/ + cardiac activity  Pt just very anxious  I have requested records for er visit  Will scan as soon as they arrive  Pt has pending OB appt w/ our ofc  Active Problems    1  Bicornuate uterus (752 34) (Q51 3)   2   Vaginal bleeding in pregnancy, first trimester (301 32) (O46 91)    Signatures   Electronically signed by : Umer Mesa, ; Apr 5 2016  2:35PM EST                       (Author)

## 2018-01-10 ENCOUNTER — APPOINTMENT (OUTPATIENT)
Dept: OBGYN | Facility: CLINIC | Age: 33
End: 2018-01-10
Payer: COMMERCIAL

## 2018-01-10 VITALS
DIASTOLIC BLOOD PRESSURE: 58 MMHG | BODY MASS INDEX: 32.43 KG/M2 | SYSTOLIC BLOOD PRESSURE: 100 MMHG | WEIGHT: 214 LBS | HEIGHT: 68 IN | RESPIRATION RATE: 16 BRPM

## 2018-01-10 LAB
BILIRUB UR QL STRIP: NORMAL
CLARITY UR: CLEAR
COLLECTION METHOD: NORMAL
GLUCOSE UR-MCNC: NORMAL
HCG UR QL: 0.2 EU/DL
HGB UR QL STRIP.AUTO: NORMAL
KETONES UR-MCNC: NORMAL
LEUKOCYTE ESTERASE UR QL STRIP: NORMAL
NITRITE UR QL STRIP: NORMAL
PH UR STRIP: 5.5
PROT UR STRIP-MCNC: NORMAL
SP GR UR STRIP: 1.03

## 2018-01-10 PROCEDURE — 76805 OB US >/= 14 WKS SNGL FETUS: CPT

## 2018-01-10 PROCEDURE — 0502F SUBSEQUENT PRENATAL CARE: CPT

## 2018-01-10 NOTE — MISCELLANEOUS
Message   Recorded as Task   Date: 2016 08:53 PM, Created By: India Cranker   Task Name: Go to Result   Assigned To: Maria C Osorio   Regarding Patient: Chelo Cain, Status: In Progress   Leland Moore - 27 May 2016 8:53 PM     TASK CREATED  please call [place]- her MRI confirms bicornuate uterus but her kidneys/renal system look normal     I would still recommend ERICKA consult (she has the information)   Bertha Gleason - 31 May 2016 8:04 AM     TASK IN PROGRESS   Bertha Gleason - 31 May 2016 8:11 AM     TASK EDITED  informed pt ktm's recommendation    pt states she wants a call from ktm with exactly how closed the uterus is and if she doesn't know, to please fins the answer to that question  to ktm        Active Problems    1  Bicornuate uterus (752 34) (Q51 3)   2  Bladder retention of urine (788 20) (R33 9)   3  Female pelvic pain (625 9) (R10 2)   4  Missed  (69 849 69 22) (O02 1)   5  Recurrent pregnancy loss    Current Meds   1  Misoprostol 200 MCG Oral Tablet; 800 mcg into vagina; Therapy: 89Qrv7393 to (Last Rx:49Jfc5433) Ordered   2  Oxycodone-Acetaminophen 5-325 MG Oral Tablet (Percocet); one tablet every 4 hours   as needed for pain; Therapy: 79Bam2108 to (Last Rx:81Poy6312) Ordered   3  Vitamin C CAPS; Therapy: (Recorded:47Fih5416) to Recorded   4  Vitamin D TABS; Therapy: (Recorded:97Cjv9658) to Recorded    Allergies    1   No Known Drug Allergies    Signatures   Electronically signed by : Jory Mckeon, ; May 31 2016  8:12AM EST                       (Author)

## 2018-01-11 NOTE — MISCELLANEOUS
Message   Recorded as Task   Date: 04/04/2016 12:07 PM, Created By: Freedom Sandhu   Task Name: Medical Complaint Callback   Assigned To: Tasha Lopez   Regarding Patient: Jane Roman, Status: In Progress   Jacques Wright - 04 Apr 2016 12:07 PM     TASK CREATED  Caller: Self; Medical Complaint; [phone] (Home)  Patient is scheduled as a NP on 4/11, follow up to ER  She left a message that she went to ER because of bleeding, still bleeding, passing clots and cramping  Her message was hard to understand  Jose JRenee - 04 Apr 2016 3:14 PM     TASK REPLIED TO: Previously Assigned To [facility],Team  p/c to pt still bleeding heavy and passing clots  I instructed pt to go back to ER  We prefer [facility] but if pt can not get there due to [place]  Pt was instructed to call ofc tomorrow   Jose JRenee - 04 Apr 2016 3:14 PM     TASK IN PROGRESS   Olivia Fountain - 05 Apr 2016 8:45 AM     TASK EDITED  pts mother called pt is still beeding the mother wants a call back @ [phone]   Vonnie Lux - 05 Apr 2016 10:27 AM     TASK REPLIED TO: Previously Assigned To [facility],Team  lmom to cb   Jose J,Renee - 05 Apr 2016 10:27 AM     TASK IN PROGRESS   Álvaro Luxee - 05 Apr 2016 2:34 PM     TASK REPLIED TO: Previously Assigned To [facility],Team  p/c to pt she and her mother are very concerned about her pregnancy and all the bleeding  We have recom pt go to ER x2  Pt went [place]  Pt was seen last evening  U/S IUP w/ + cardiac activity  Pt just very anxious  I have requested records for er visit  Will scan as soon as they arrive  Pt has pending ob appt w/ our ofc  Jose J,Renee - 12 Apr 2016 7:54 AM     TASK REPLIED TO: Previously Assigned To [facility],Team  appt on 4/11/16 w/ KTM no FHT  awaiting to see what pt wishes to do   Álvaro Luxee - 19 Apr 2016 1:38 PM     TASK REPLIED TO: Previously Assigned To [facility],Team  lmom to pt check on pt     Jose JRenee - 26 Apr 2016 9:04 AM     TASK REPLIED TO: Previously Assigned To [facility],Team  pt was seen in our ofc on 16 w/ MIKEY  Pt decided on cytotec  Pt has f/u appt  pending w/ Suhail Akbar - 03 May 2016 9:27 AM     TASK REPLIED TO: Previously Assigned To [facility],Team  Pt was seen on 16 by MIKEY  Not sure if anything further is needed so kept triage open  Vonnie Lux - 18 May 2016 1:49 PM     TASK REPLIED TO: Previously Assigned To [facility],Team  per note in chart complete ab  Active Problems    1  Bicornuate uterus (752 34) (Q51 3)   2  Bladder retention of urine (788 20) (R33 9)   3  Female pelvic pain (625 9) (R10 2)   4  Missed  (69 849 69 22) (O02 1)   5  Recurrent pregnancy loss    Current Meds   1  Misoprostol 200 MCG Oral Tablet; 800 mcg into vagina; Therapy: 56Gza3814 to (Last Rx:68Gxi5454) Ordered   2  Oxycodone-Acetaminophen 5-325 MG Oral Tablet (Percocet); one tablet every 4 hours   as needed for pain; Therapy: 67Agk8078 to (Last Rx:53Fxr7676) Ordered   3  Vitamin C CAPS; Therapy: (Recorded:70Bed9317) to Recorded   4  Vitamin D TABS; Therapy: (Recorded:19Iyr9006) to Recorded    Allergies    1   No Known Drug Allergies    Signatures   Electronically signed by : Terrence Rocha, ; May  3 2016  1:50PM EST                       (Author)

## 2018-01-12 NOTE — MISCELLANEOUS
Message   Recorded as Task   Date: 2016 12:33 PM, Created By: Inés Rouse   Task Name: Financial Authorization   Assigned To: [place]co [place]   Regarding Patient: Sakshi Ingram, Status: Active   CommentVena Samson - 25 May 2016 12:33 PM     TASK CREATED    called [employer] for preauth pt having mri of pelvis 01272 and mri abdomen 22467 can include w/wo contrast, auth for both is [id number], good for 30 days at  [address]     1  Bicornuate uterus (752 34) (Q51 3)   2  Bladder retention of urine (788 20) (R33 9)   3  Female pelvic pain (625 9) (R10 2)   4  Missed  (69 849 69 22) (O02 1)   5  Recurrent pregnancy loss    Current Meds   1  Misoprostol 200 MCG Oral Tablet; 800 mcg into vagina; Therapy: 87Fhi7354 to (Last Rx:42Omh0886) Ordered   2  Oxycodone-Acetaminophen 5-325 MG Oral Tablet (Percocet); one tablet every 4 hours   as needed for pain; Therapy: 74Uba0724 to (Last Rx:30Luq7219) Ordered   3  Vitamin C CAPS; Therapy: (Recorded:99Qax6592) to Recorded   4  Vitamin D TABS; Therapy: (Recorded:60Rjt4589) to Recorded    Allergies    1   No Known Drug Allergies    Signatures   Electronically signed by : Alvarado Rodriguez, ; May 25 2016 12:33PM EST                       (Author)

## 2018-01-12 NOTE — RESULT NOTES
Message   Recorded as Task   Date: 05/27/2016 08:53 PM, Created By: Kya Sierra   Task Name: Go to Result   Assigned To: Kya Sierra   Regarding Patient: Rudi Peacock, Status: In Progress   CommentIban Jackman - 27 May 2016 8:53 PM     TASK CREATED  please call Corinne Gresham- her MRI confirms bicornuate uterus but her kidneys/renal system look normal     I would still recommend ERICKA consult (she has the information)   Bertha Gleason - 31 May 2016 8:04 AM     TASK IN PROGRESS   Bertha Gleason - 31 May 2016 8:11 AM     TASK EDITED  informed pt ktm's recommendation    pt states she wants a call from ktm with exactly how closed the uterus is and if she doesn't know, to please fins the answer to that question  to Cyndi Roberts - 31 May 2016 8:12 AM     TASK REASSIGNED: [facility]   callled and left voicemail  MRI just states two uterine cavitis visualized coming off of the cervix- no mention of one cavity being closed or different size   Usually hystereoscopy or HSG to evaluate cavity      Signatures   Electronically signed by : Thao Dougherty MD; May 31 2016 10:58AM EST                       (Author)

## 2018-01-15 LAB
BASOPHILS # BLD AUTO: 0.02 K/UL
BASOPHILS NFR BLD AUTO: 0.2 %
EOSINOPHIL # BLD AUTO: 0.06 K/UL
EOSINOPHIL NFR BLD AUTO: 0.7 %
GP B STREP DNA SPEC QL NAA+PROBE: NORMAL
GP B STREP DNA SPEC QL NAA+PROBE: NOT DETECTED
HCT VFR BLD CALC: 40.4 %
HGB BLD-MCNC: 12.8 G/DL
HIV1+2 AB SPEC QL IA.RAPID: NONREACTIVE
IMM GRANULOCYTES NFR BLD AUTO: 1.1 %
LYMPHOCYTES # BLD AUTO: 1.06 K/UL
LYMPHOCYTES NFR BLD AUTO: 12.8 %
MAN DIFF?: NORMAL
MCHC RBC-ENTMCNC: 31.4 PG
MCHC RBC-ENTMCNC: 31.7 GM/DL
MCV RBC AUTO: 99.3 FL
MONOCYTES # BLD AUTO: 0.81 K/UL
MONOCYTES NFR BLD AUTO: 9.8 %
NEUTROPHILS # BLD AUTO: 6.23 K/UL
NEUTROPHILS NFR BLD AUTO: 75.4 %
PLATELET # BLD AUTO: 155 K/UL
RBC # BLD: 4.07 M/UL
RBC # FLD: 13.8 %
SOURCE GBS: NORMAL
WBC # FLD AUTO: 8.27 K/UL

## 2018-01-17 ENCOUNTER — NON-APPOINTMENT (OUTPATIENT)
Age: 33
End: 2018-01-17

## 2018-01-17 ENCOUNTER — INPATIENT (INPATIENT)
Facility: HOSPITAL | Age: 33
LOS: 3 days | Discharge: HOME | End: 2018-01-21
Attending: OBSTETRICS & GYNECOLOGY | Admitting: OBSTETRICS & GYNECOLOGY
Payer: COMMERCIAL

## 2018-01-17 ENCOUNTER — APPOINTMENT (OUTPATIENT)
Dept: OBGYN | Facility: CLINIC | Age: 33
End: 2018-01-17
Payer: COMMERCIAL

## 2018-01-17 VITALS — HEIGHT: 68 IN | WEIGHT: 213.85 LBS

## 2018-01-17 PROCEDURE — 59510 CESAREAN DELIVERY: CPT

## 2018-01-17 PROCEDURE — 0502F SUBSEQUENT PRENATAL CARE: CPT

## 2018-01-17 RX ORDER — CITRIC ACID/SODIUM CITRATE 300-500 MG
30 SOLUTION, ORAL ORAL ONCE
Qty: 0 | Refills: 0 | Status: DISCONTINUED | OUTPATIENT
Start: 2018-01-17 | End: 2018-01-18

## 2018-01-17 RX ORDER — SODIUM CHLORIDE 9 MG/ML
500 INJECTION, SOLUTION INTRAVENOUS ONCE
Qty: 0 | Refills: 0 | Status: DISCONTINUED | OUTPATIENT
Start: 2018-01-17 | End: 2018-01-18

## 2018-01-17 RX ORDER — OXYTOCIN 10 UNIT/ML
333.33 VIAL (ML) INJECTION
Qty: 20 | Refills: 0 | Status: DISCONTINUED | OUTPATIENT
Start: 2018-01-17 | End: 2018-01-18

## 2018-01-17 RX ORDER — SODIUM CHLORIDE 9 MG/ML
1000 INJECTION, SOLUTION INTRAVENOUS
Qty: 0 | Refills: 0 | Status: DISCONTINUED | OUTPATIENT
Start: 2018-01-17 | End: 2018-01-18

## 2018-01-18 LAB
ALLERGY+IMMUNOLOGY DIAG STUDY NOTE: (no result)
ALLERGY+IMMUNOLOGY DIAG STUDY NOTE: SIGNIFICANT CHANGE UP
BASOPHILS # BLD AUTO: 0.1 K/UL — SIGNIFICANT CHANGE UP (ref 0–0.2)
BASOPHILS NFR BLD AUTO: 0.6 % — SIGNIFICANT CHANGE UP (ref 0–2)
EOSINOPHIL # BLD AUTO: 0.1 K/UL — SIGNIFICANT CHANGE UP (ref 0–0.5)
EOSINOPHIL NFR BLD AUTO: 1.4 % — SIGNIFICANT CHANGE UP (ref 0–6)
HCT VFR BLD CALC: 36.6 % — SIGNIFICANT CHANGE UP (ref 34.5–45)
HGB BLD-MCNC: 12.6 G/DL — SIGNIFICANT CHANGE UP (ref 11.5–15.5)
LYMPHOCYTES # BLD AUTO: 1.8 K/UL — SIGNIFICANT CHANGE UP (ref 1–3.3)
LYMPHOCYTES # BLD AUTO: 18.2 % — SIGNIFICANT CHANGE UP (ref 13–44)
MCHC RBC-ENTMCNC: 31.7 PG — SIGNIFICANT CHANGE UP (ref 27–34)
MCHC RBC-ENTMCNC: 34.6 GM/DL — SIGNIFICANT CHANGE UP (ref 32–36)
MCV RBC AUTO: 91.7 FL — SIGNIFICANT CHANGE UP (ref 80–100)
MONOCYTES # BLD AUTO: 0.9 K/UL — SIGNIFICANT CHANGE UP (ref 0–0.9)
MONOCYTES NFR BLD AUTO: 8.9 % — SIGNIFICANT CHANGE UP (ref 2–14)
NEUTROPHILS # BLD AUTO: 7.1 K/UL — SIGNIFICANT CHANGE UP (ref 1.8–7.4)
NEUTROPHILS NFR BLD AUTO: 70.9 % — SIGNIFICANT CHANGE UP (ref 43–77)
PLATELET # BLD AUTO: 168 K/UL — SIGNIFICANT CHANGE UP (ref 150–400)
RBC # BLD: 3.99 M/UL — SIGNIFICANT CHANGE UP (ref 3.8–5.2)
RBC # FLD: 11.7 % — SIGNIFICANT CHANGE UP (ref 10.3–14.5)
T PALLIDUM AB TITR SER: NEGATIVE — SIGNIFICANT CHANGE UP
TYPE + AB SCN PNL BLD: SIGNIFICANT CHANGE UP
WBC # BLD: 10 K/UL — SIGNIFICANT CHANGE UP (ref 3.8–10.5)
WBC # FLD AUTO: 10 K/UL — SIGNIFICANT CHANGE UP (ref 3.8–10.5)

## 2018-01-18 PROCEDURE — 59510 CESAREAN DELIVERY: CPT

## 2018-01-18 PROCEDURE — 86077 PHYS BLOOD BANK SERV XMATCH: CPT

## 2018-01-18 RX ORDER — DOCUSATE SODIUM 100 MG
100 CAPSULE ORAL
Qty: 0 | Refills: 0 | Status: DISCONTINUED | OUTPATIENT
Start: 2018-01-18 | End: 2018-01-18

## 2018-01-18 RX ORDER — GLYCERIN ADULT
1 SUPPOSITORY, RECTAL RECTAL AT BEDTIME
Qty: 0 | Refills: 0 | Status: DISCONTINUED | OUTPATIENT
Start: 2018-01-18 | End: 2018-01-18

## 2018-01-18 RX ORDER — OXYTOCIN 10 UNIT/ML
333.33 VIAL (ML) INJECTION
Qty: 20 | Refills: 0 | Status: DISCONTINUED | OUTPATIENT
Start: 2018-01-18 | End: 2018-01-18

## 2018-01-18 RX ORDER — OXYCODONE HYDROCHLORIDE 5 MG/1
10 TABLET ORAL
Qty: 0 | Refills: 0 | Status: DISCONTINUED | OUTPATIENT
Start: 2018-01-18 | End: 2018-01-21

## 2018-01-18 RX ORDER — LANOLIN
1 OINTMENT (GRAM) TOPICAL
Qty: 0 | Refills: 0 | Status: DISCONTINUED | OUTPATIENT
Start: 2018-01-18 | End: 2018-01-18

## 2018-01-18 RX ORDER — SIMETHICONE 80 MG/1
80 TABLET, CHEWABLE ORAL EVERY 4 HOURS
Qty: 0 | Refills: 0 | Status: DISCONTINUED | OUTPATIENT
Start: 2018-01-18 | End: 2018-01-21

## 2018-01-18 RX ORDER — ACETAMINOPHEN 500 MG
650 TABLET ORAL EVERY 6 HOURS
Qty: 0 | Refills: 0 | Status: DISCONTINUED | OUTPATIENT
Start: 2018-01-18 | End: 2018-01-21

## 2018-01-18 RX ORDER — SODIUM CHLORIDE 9 MG/ML
1000 INJECTION, SOLUTION INTRAVENOUS
Qty: 0 | Refills: 0 | Status: DISCONTINUED | OUTPATIENT
Start: 2018-01-18 | End: 2018-01-18

## 2018-01-18 RX ORDER — TETANUS TOXOID, REDUCED DIPHTHERIA TOXOID AND ACELLULAR PERTUSSIS VACCINE, ADSORBED 5; 2.5; 8; 8; 2.5 [IU]/.5ML; [IU]/.5ML; UG/.5ML; UG/.5ML; UG/.5ML
0.5 SUSPENSION INTRAMUSCULAR ONCE
Qty: 0 | Refills: 0 | Status: DISCONTINUED | OUTPATIENT
Start: 2018-01-18 | End: 2018-01-21

## 2018-01-18 RX ORDER — SODIUM CHLORIDE 9 MG/ML
1000 INJECTION, SOLUTION INTRAVENOUS
Qty: 0 | Refills: 0 | Status: DISCONTINUED | OUTPATIENT
Start: 2018-01-18 | End: 2018-01-21

## 2018-01-18 RX ORDER — FERROUS SULFATE 325(65) MG
325 TABLET ORAL DAILY
Qty: 0 | Refills: 0 | Status: DISCONTINUED | OUTPATIENT
Start: 2018-01-18 | End: 2018-01-21

## 2018-01-18 RX ORDER — GLYCERIN ADULT
1 SUPPOSITORY, RECTAL RECTAL AT BEDTIME
Qty: 0 | Refills: 0 | Status: DISCONTINUED | OUTPATIENT
Start: 2018-01-18 | End: 2018-01-21

## 2018-01-18 RX ORDER — NALOXONE HYDROCHLORIDE 4 MG/.1ML
0.1 SPRAY NASAL
Qty: 0 | Refills: 0 | Status: DISCONTINUED | OUTPATIENT
Start: 2018-01-18 | End: 2018-01-21

## 2018-01-18 RX ORDER — HEPARIN SODIUM 5000 [USP'U]/ML
5000 INJECTION INTRAVENOUS; SUBCUTANEOUS EVERY 12 HOURS
Qty: 0 | Refills: 0 | Status: DISCONTINUED | OUTPATIENT
Start: 2018-01-18 | End: 2018-01-21

## 2018-01-18 RX ORDER — OXYCODONE AND ACETAMINOPHEN 5; 325 MG/1; MG/1
2 TABLET ORAL EVERY 6 HOURS
Qty: 0 | Refills: 0 | Status: DISCONTINUED | OUTPATIENT
Start: 2018-01-18 | End: 2018-01-18

## 2018-01-18 RX ORDER — OXYCODONE HYDROCHLORIDE 5 MG/1
5 TABLET ORAL
Qty: 0 | Refills: 0 | Status: DISCONTINUED | OUTPATIENT
Start: 2018-01-18 | End: 2018-01-21

## 2018-01-18 RX ORDER — OXYCODONE AND ACETAMINOPHEN 5; 325 MG/1; MG/1
1 TABLET ORAL
Qty: 0 | Refills: 0 | Status: DISCONTINUED | OUTPATIENT
Start: 2018-01-18 | End: 2018-01-18

## 2018-01-18 RX ORDER — DIPHENHYDRAMINE HCL 50 MG
25 CAPSULE ORAL EVERY 6 HOURS
Qty: 0 | Refills: 0 | Status: DISCONTINUED | OUTPATIENT
Start: 2018-01-18 | End: 2018-01-18

## 2018-01-18 RX ORDER — DOCUSATE SODIUM 100 MG
100 CAPSULE ORAL
Qty: 0 | Refills: 0 | Status: DISCONTINUED | OUTPATIENT
Start: 2018-01-18 | End: 2018-01-21

## 2018-01-18 RX ORDER — ONDANSETRON 8 MG/1
4 TABLET, FILM COATED ORAL EVERY 6 HOURS
Qty: 0 | Refills: 0 | Status: DISCONTINUED | OUTPATIENT
Start: 2018-01-18 | End: 2018-01-21

## 2018-01-18 RX ORDER — OXYCODONE AND ACETAMINOPHEN 5; 325 MG/1; MG/1
2 TABLET ORAL EVERY 6 HOURS
Qty: 0 | Refills: 0 | Status: DISCONTINUED | OUTPATIENT
Start: 2018-01-18 | End: 2018-01-21

## 2018-01-18 RX ORDER — SIMETHICONE 80 MG/1
80 TABLET, CHEWABLE ORAL EVERY 4 HOURS
Qty: 0 | Refills: 0 | Status: DISCONTINUED | OUTPATIENT
Start: 2018-01-18 | End: 2018-01-18

## 2018-01-18 RX ORDER — HYDROMORPHONE HYDROCHLORIDE 2 MG/ML
1 INJECTION INTRAMUSCULAR; INTRAVENOUS; SUBCUTANEOUS
Qty: 0 | Refills: 0 | Status: DISCONTINUED | OUTPATIENT
Start: 2018-01-18 | End: 2018-01-21

## 2018-01-18 RX ORDER — OXYTOCIN 10 UNIT/ML
41.67 VIAL (ML) INJECTION
Qty: 20 | Refills: 0 | Status: DISCONTINUED | OUTPATIENT
Start: 2018-01-18 | End: 2018-01-18

## 2018-01-18 RX ORDER — FERROUS SULFATE 325(65) MG
325 TABLET ORAL DAILY
Qty: 0 | Refills: 0 | Status: DISCONTINUED | OUTPATIENT
Start: 2018-01-18 | End: 2018-01-18

## 2018-01-18 RX ORDER — LANOLIN
1 OINTMENT (GRAM) TOPICAL
Qty: 0 | Refills: 0 | Status: DISCONTINUED | OUTPATIENT
Start: 2018-01-18 | End: 2018-01-21

## 2018-01-18 RX ORDER — ACETAMINOPHEN 500 MG
650 TABLET ORAL EVERY 6 HOURS
Qty: 0 | Refills: 0 | Status: DISCONTINUED | OUTPATIENT
Start: 2018-01-18 | End: 2018-01-18

## 2018-01-18 RX ORDER — TETANUS TOXOID, REDUCED DIPHTHERIA TOXOID AND ACELLULAR PERTUSSIS VACCINE, ADSORBED 5; 2.5; 8; 8; 2.5 [IU]/.5ML; [IU]/.5ML; UG/.5ML; UG/.5ML; UG/.5ML
0.5 SUSPENSION INTRAMUSCULAR ONCE
Qty: 0 | Refills: 0 | Status: DISCONTINUED | OUTPATIENT
Start: 2018-01-18 | End: 2018-01-18

## 2018-01-18 RX ORDER — DIPHENHYDRAMINE HCL 50 MG
25 CAPSULE ORAL EVERY 6 HOURS
Qty: 0 | Refills: 0 | Status: DISCONTINUED | OUTPATIENT
Start: 2018-01-18 | End: 2018-01-21

## 2018-01-18 RX ORDER — IBUPROFEN 200 MG
600 TABLET ORAL EVERY 6 HOURS
Qty: 0 | Refills: 0 | Status: DISCONTINUED | OUTPATIENT
Start: 2018-01-18 | End: 2018-01-21

## 2018-01-18 RX ORDER — IBUPROFEN 200 MG
600 TABLET ORAL EVERY 6 HOURS
Qty: 0 | Refills: 0 | Status: DISCONTINUED | OUTPATIENT
Start: 2018-01-18 | End: 2018-01-18

## 2018-01-18 RX ORDER — OXYCODONE AND ACETAMINOPHEN 5; 325 MG/1; MG/1
1 TABLET ORAL
Qty: 0 | Refills: 0 | Status: DISCONTINUED | OUTPATIENT
Start: 2018-01-18 | End: 2018-01-21

## 2018-01-18 RX ORDER — OXYTOCIN 10 UNIT/ML
41.67 VIAL (ML) INJECTION
Qty: 20 | Refills: 0 | Status: DISCONTINUED | OUTPATIENT
Start: 2018-01-18 | End: 2018-01-21

## 2018-01-18 RX ADMIN — SODIUM CHLORIDE 125 MILLILITER(S): 9 INJECTION, SOLUTION INTRAVENOUS at 07:21

## 2018-01-18 RX ADMIN — Medication 125 MILLIUNIT(S)/MIN: at 12:21

## 2018-01-18 RX ADMIN — HEPARIN SODIUM 5000 UNIT(S): 5000 INJECTION INTRAVENOUS; SUBCUTANEOUS at 18:07

## 2018-01-18 RX ADMIN — SODIUM CHLORIDE 125 MILLILITER(S): 9 INJECTION, SOLUTION INTRAVENOUS at 00:20

## 2018-01-18 RX ADMIN — OXYCODONE AND ACETAMINOPHEN 1 TABLET(S): 5; 325 TABLET ORAL at 22:10

## 2018-01-18 RX ADMIN — Medication 125 MILLIUNIT(S)/MIN: at 12:11

## 2018-01-18 RX ADMIN — OXYCODONE AND ACETAMINOPHEN 1 TABLET(S): 5; 325 TABLET ORAL at 23:00

## 2018-01-19 LAB
BASOPHILS # BLD AUTO: 0 K/UL — SIGNIFICANT CHANGE UP (ref 0–0.2)
BASOPHILS NFR BLD AUTO: 0.3 % — SIGNIFICANT CHANGE UP (ref 0–2)
EOSINOPHIL # BLD AUTO: 0 K/UL — SIGNIFICANT CHANGE UP (ref 0–0.5)
EOSINOPHIL NFR BLD AUTO: 0.3 % — SIGNIFICANT CHANGE UP (ref 0–6)
FETAL SCREEN: SIGNIFICANT CHANGE UP
HCT VFR BLD CALC: 36.9 % — SIGNIFICANT CHANGE UP (ref 34.5–45)
HGB BLD-MCNC: 12.3 G/DL — SIGNIFICANT CHANGE UP (ref 11.5–15.5)
LYMPHOCYTES # BLD AUTO: 1.9 K/UL — SIGNIFICANT CHANGE UP (ref 1–3.3)
LYMPHOCYTES # BLD AUTO: 15.7 % — SIGNIFICANT CHANGE UP (ref 13–44)
MCHC RBC-ENTMCNC: 31.2 PG — SIGNIFICANT CHANGE UP (ref 27–34)
MCHC RBC-ENTMCNC: 33.4 GM/DL — SIGNIFICANT CHANGE UP (ref 32–36)
MCV RBC AUTO: 93.2 FL — SIGNIFICANT CHANGE UP (ref 80–100)
MONOCYTES # BLD AUTO: 0.8 K/UL — SIGNIFICANT CHANGE UP (ref 0–0.9)
MONOCYTES NFR BLD AUTO: 6.6 % — SIGNIFICANT CHANGE UP (ref 2–14)
NEUTROPHILS # BLD AUTO: 9.6 K/UL — HIGH (ref 1.8–7.4)
NEUTROPHILS NFR BLD AUTO: 77.1 % — HIGH (ref 43–77)
PLATELET # BLD AUTO: 158 K/UL — SIGNIFICANT CHANGE UP (ref 150–400)
RBC # BLD: 3.96 M/UL — SIGNIFICANT CHANGE UP (ref 3.8–5.2)
RBC # FLD: 11.9 % — SIGNIFICANT CHANGE UP (ref 10.3–14.5)
WBC # BLD: 12.4 K/UL — HIGH (ref 3.8–10.5)
WBC # FLD AUTO: 12.4 K/UL — HIGH (ref 3.8–10.5)

## 2018-01-19 RX ADMIN — Medication 325 MILLIGRAM(S): at 12:47

## 2018-01-19 RX ADMIN — Medication 600 MILLIGRAM(S): at 15:38

## 2018-01-19 RX ADMIN — Medication 600 MILLIGRAM(S): at 07:50

## 2018-01-19 RX ADMIN — Medication 100 MILLIGRAM(S): at 21:35

## 2018-01-19 RX ADMIN — OXYCODONE AND ACETAMINOPHEN 1 TABLET(S): 5; 325 TABLET ORAL at 03:45

## 2018-01-19 RX ADMIN — Medication 600 MILLIGRAM(S): at 08:30

## 2018-01-19 RX ADMIN — HEPARIN SODIUM 5000 UNIT(S): 5000 INJECTION INTRAVENOUS; SUBCUTANEOUS at 17:50

## 2018-01-19 RX ADMIN — OXYCODONE HYDROCHLORIDE 10 MILLIGRAM(S): 5 TABLET ORAL at 15:38

## 2018-01-19 RX ADMIN — HEPARIN SODIUM 5000 UNIT(S): 5000 INJECTION INTRAVENOUS; SUBCUTANEOUS at 05:35

## 2018-01-19 RX ADMIN — Medication 1 TABLET(S): at 12:47

## 2018-01-19 RX ADMIN — OXYCODONE AND ACETAMINOPHEN 1 TABLET(S): 5; 325 TABLET ORAL at 04:30

## 2018-01-19 RX ADMIN — OXYCODONE HYDROCHLORIDE 10 MILLIGRAM(S): 5 TABLET ORAL at 22:35

## 2018-01-19 RX ADMIN — OXYCODONE HYDROCHLORIDE 10 MILLIGRAM(S): 5 TABLET ORAL at 21:35

## 2018-01-19 NOTE — PROGRESS NOTE ADULT - SUBJECTIVE AND OBJECTIVE BOX
Patient evaluated at bedside approx. 1015am  She reports pain is well controlled.  She denies headache, dizziness, chest pain, palpitations, shortness of breath, nausea, vomiting, heavy vaginal bleeding.  She has been ambulating without assistance, voiding spontaneously and is breastfeeding.  She is tolerating diet.    Physical Exam:  Vital Signs Last 24 Hrs  T(C): 36.9 (19 Jan 2018 07:50), Max: 36.9 (18 Jan 2018 19:52)  T(F): 98.5 (19 Jan 2018 07:50), Max: 98.5 (19 Jan 2018 07:50)  HR: 105 (19 Jan 2018 07:50) (81 - 105)  BP: 119/70 (19 Jan 2018 07:50) (108/50 - 144/72)  BP(mean): --  RR: 16 (19 Jan 2018 07:50) (16 - 18)  SpO2: 98% (19 Jan 2018 07:50) (97% - 98%)  I&O's Summary    18 Jan 2018 07:01  -  19 Jan 2018 07:00  --------------------------------------------------------  IN: 3800 mL / OUT: 4800 mL / NET: -1000 mL        NAD, A+0 x 3  Breasts: soft, nontender, no palpable masses, nipples intact and everted  Abd: + BS, soft, mild tenderness, slight distention, uterus firm at midline,   : lochia WNL  Extremities: no swelling or calf tenderness,                           12.3   12.4  )-----------( 158      ( 19 Jan 2018 07:17 )             36.9                         12.6   10.0  )-----------( 168      ( 18 Jan 2018 00:10 )             36.6             01-18 @ 00:10  RH: --  Type + Screen: O NEG      Assessment:  POD 1 s/p primary Csection secondary to breech presentation  breastfeeding     Plan:  advance post op care  Encourage breastfeeding

## 2018-01-20 VITALS
RESPIRATION RATE: 16 BRPM | OXYGEN SATURATION: 98 % | HEART RATE: 83 BPM | TEMPERATURE: 98 F | SYSTOLIC BLOOD PRESSURE: 111 MMHG | DIASTOLIC BLOOD PRESSURE: 59 MMHG

## 2018-01-20 RX ADMIN — OXYCODONE HYDROCHLORIDE 5 MILLIGRAM(S): 5 TABLET ORAL at 13:35

## 2018-01-20 RX ADMIN — Medication 600 MILLIGRAM(S): at 21:13

## 2018-01-20 RX ADMIN — OXYCODONE HYDROCHLORIDE 5 MILLIGRAM(S): 5 TABLET ORAL at 21:13

## 2018-01-20 RX ADMIN — Medication 600 MILLIGRAM(S): at 07:17

## 2018-01-20 RX ADMIN — Medication 100 MILLIGRAM(S): at 12:30

## 2018-01-20 RX ADMIN — OXYCODONE HYDROCHLORIDE 5 MILLIGRAM(S): 5 TABLET ORAL at 07:17

## 2018-01-20 RX ADMIN — OXYCODONE HYDROCHLORIDE 5 MILLIGRAM(S): 5 TABLET ORAL at 06:17

## 2018-01-20 RX ADMIN — Medication 600 MILLIGRAM(S): at 13:35

## 2018-01-20 RX ADMIN — HEPARIN SODIUM 5000 UNIT(S): 5000 INJECTION INTRAVENOUS; SUBCUTANEOUS at 06:16

## 2018-01-20 RX ADMIN — OXYCODONE HYDROCHLORIDE 5 MILLIGRAM(S): 5 TABLET ORAL at 22:13

## 2018-01-20 RX ADMIN — Medication 600 MILLIGRAM(S): at 06:17

## 2018-01-20 RX ADMIN — HEPARIN SODIUM 5000 UNIT(S): 5000 INJECTION INTRAVENOUS; SUBCUTANEOUS at 18:04

## 2018-01-20 RX ADMIN — Medication 600 MILLIGRAM(S): at 22:00

## 2018-01-20 RX ADMIN — Medication 325 MILLIGRAM(S): at 12:31

## 2018-01-20 RX ADMIN — Medication 1 TABLET(S): at 12:30

## 2018-01-20 NOTE — PROGRESS NOTE ADULT - SUBJECTIVE AND OBJECTIVE BOX
Postpartum Note,  Section  She is a  32y woman who is now post-operative day: 2  chart reviewed and nursing input solicited    Subjective:  The patient feels well.  complaints: none  her pain is controlled  she reports normal postpartum bleeding--- she is tolerating a regular diet and ambulating well.  focused ROS: negative      Physical exam:    Vital Signs Last 24 Hrs  T(C): 36.8 (2018 21:00), Max: 36.8 (2018 16:00)  T(F): 98.3 (2018 21:00), Max: 98.3 (2018 16:00)  HR: 93 (2018 21:00) (92 - 93)  BP: 120/67 (2018 21:00) (112/62 - 127/71)  BP(mean): --  RR: 16 (2018 21:00) (16 - 16)  SpO2: 99% (2018 21:00) (98% - 100%)     Abdomen: Soft, nontender, non-distended, uterine fundus firm and  below umbilicus.  Incision: Clean, dry, and intact   Pelvic: Normal lochia noted  Ext: lower extremeities symmetric and calves non-tender    LABS:                        12.3   12.4  )-----------( 158      ( 2018 07:17 )             36.9       Rubella status: immune  RH status: negative --- s/p Rhogam    Allergies    No Known Allergies    Intolerances      MEDICATIONS  (STANDING):  diphtheria/tetanus/pertussis (acellular) Vaccine (ADAcel) 0.5 milliLiter(s) IntraMuscular once  ferrous    sulfate 325 milliGRAM(s) Oral daily  heparin  Injectable 5000 Unit(s) SubCutaneous every 12 hours  lactated ringers. 1000 milliLiter(s) (125 mL/Hr) IV Continuous <Continuous>  lactated ringers. 1000 milliLiter(s) (125 mL/Hr) IV Continuous <Continuous>  lactated ringers. 1000 milliLiter(s) (125 mL/Hr) IV Continuous <Continuous>  oxytocin Infusion 41.667 milliUNIT(s)/Min (125 mL/Hr) IV Continuous <Continuous>  oxytocin Infusion 41.667 milliUNIT(s)/Min (125 mL/Hr) IV Continuous <Continuous>  prenatal multivitamin 1 Tablet(s) Oral daily    MEDICATIONS  (PRN):  acetaminophen   Tablet 650 milliGRAM(s) Oral every 6 hours PRN For Temp greater than 38.5 C (101.3 F)  diphenhydrAMINE   Capsule 25 milliGRAM(s) Oral every 6 hours PRN Itching  docusate sodium 100 milliGRAM(s) Oral two times a day PRN Stool Softening  glycerin Suppository - Adult 1 Suppository(s) Rectal at bedtime PRN Constipation  HYDROmorphone  Injectable 1 milliGRAM(s) IV Push every 3 hours PRN Severe Pain  ibuprofen  Tablet 600 milliGRAM(s) Oral every 6 hours PRN Mild pain or headache  lanolin Ointment 1 Application(s) Topical every 3 hours PRN Sore Nipples  naloxone Injectable 0.1 milliGRAM(s) IV Push every 3 minutes PRN For ANY of the following changes in patient status:  A. RR LESS THAN 10 breaths per minute, B. Oxygen saturation LESS THAN 90%, C. Sedation score of 6  ondansetron Injectable 4 milliGRAM(s) IV Push every 6 hours PRN Nausea  oxyCODONE    5 mG/acetaminophen 325 mG 1 Tablet(s) Oral every 3 hours PRN Moderate Pain  oxyCODONE    5 mG/acetaminophen 325 mG 2 Tablet(s) Oral every 6 hours PRN Severe Pain  oxyCODONE    IR 5 milliGRAM(s) Oral every 3 hours PRN Mild Pain  oxyCODONE    IR 10 milliGRAM(s) Oral every 3 hours PRN Moderate Pain  simethicone 80 milliGRAM(s) Chew every 4 hours PRN Gas        Assessment and Plan  Doing well. Routine care

## 2018-01-21 ENCOUNTER — TRANSCRIPTION ENCOUNTER (OUTPATIENT)
Age: 33
End: 2018-01-21

## 2018-01-21 RX ORDER — DOCUSATE SODIUM 100 MG
1 CAPSULE ORAL
Qty: 20 | Refills: 0 | OUTPATIENT
Start: 2018-01-21

## 2018-01-21 RX ORDER — PROGESTERONE 200 MG/1
0 CAPSULE, LIQUID FILLED ORAL
Qty: 0 | Refills: 0 | COMMUNITY

## 2018-01-21 RX ORDER — IBUPROFEN 200 MG
1 TABLET ORAL
Qty: 20 | Refills: 0 | OUTPATIENT
Start: 2018-01-21

## 2018-01-21 RX ADMIN — Medication 600 MILLIGRAM(S): at 11:54

## 2018-01-21 RX ADMIN — Medication 600 MILLIGRAM(S): at 06:22

## 2018-01-21 RX ADMIN — OXYCODONE HYDROCHLORIDE 5 MILLIGRAM(S): 5 TABLET ORAL at 06:22

## 2018-01-21 RX ADMIN — HEPARIN SODIUM 5000 UNIT(S): 5000 INJECTION INTRAVENOUS; SUBCUTANEOUS at 06:22

## 2018-01-21 NOTE — DISCHARGE NOTE OB - PATIENT PORTAL LINK FT
“You can access the [employer] Patient Portal, offered by [facility], by registering with the following website: [email or web address]”

## 2018-01-21 NOTE — DISCHARGE NOTE OB - PLAN OF CARE
successful transition to postpartum period Please follow up with OB/GYN Dr. Finney within 1 week, then follow up with OB in [place]  Continue prenatal vitamins, pain control PRN as prescribed, and colace for constipation  You had Rhogam injection due to being RH negative  Please call provider or return for any of the following as outlined below..

## 2018-01-21 NOTE — DISCHARGE NOTE OB - HOSPITAL COURSE
33 y/o F multigravid nulliparous female presented with labor. Primary  had to be done due to breech presentation. Had uneventful hospital course after delivery. To follow up with GYN Dr. Finney within 1 week prior to departure back to [place]. Requested circumcision for baby. Bonded well. Had some difficulty with breastfeeding and had engorgement. Had lactation consult and counseled.

## 2018-01-21 NOTE — DISCHARGE NOTE OB - CARE PLAN
Principal Discharge DX:	 delivery delivered  Goal:	successful transition to postpartum period  Assessment and plan of treatment:	Please follow up with OB/GYN Dr. Finney within 1 week, then follow up with OB in [place]  Continue prenatal vitamins, pain control PRN as prescribed, and colace for constipation  You had Rhogam injection due to being RH negative  Please call provider or return for any of the following as outlined below..

## 2018-01-21 NOTE — DISCHARGE NOTE OB - CARE PROVIDER_API CALL
Pedro Finney), Obstetrics and Gynecology  [address], [place]   Phone: [phone]  Fax: [phone]

## 2018-01-21 NOTE — PROGRESS NOTE ADULT - SUBJECTIVE AND OBJECTIVE BOX
Postpartum Note,  Section  Patient is a 32y F s/p  POD#3.    Subjective: Patient seen and examined at bedside. No acute events overnight. Pain is controlled with current pain regimen. She is ambulating well and tolerating PO diet/fluids. She reports normal postpartum bleeding. Denies bleeding/discharge at incision site. She is voiding well and reports flatus without yet having a BM. Reports breastfeeding yesterday with some difficulty of baby latching to nipple. Today, has not been able to breastfeed due to difficulty expressing milk. Has been massaging breasts in warm water. Tried using breast pump as well. Had lactation education. Bonding well with baby. Denies fever, headache, changes in vision, nausea, vomiting. Otherwise, patient feels well without additional complaints. Requesting circumcision for baby.    Rubella status: immune  RH status: negative - s/p Rhogam    Physical exam:  Vital Signs Last 24 Hrs  T(C): 36.7 (2018 21:00), Max: 36.7 (2018 21:00)  T(F): 98.1 (2018 21:00), Max: 98.1 (2018 21:00)  HR: 83 (2018 21:00) (83 - 83)  BP: 111/59 (2018 21:00) (111/59 - 111/59)  BP(mean): --  RR: 16 (2018 21:00) (16 - 16)  SpO2: 98% (2018 21:00) (98% - 98%)    Gen: NAD  Breast: Soft, nontender; +engorgement  Cardio: S1,S2 no murmurs  Lungs: CTA B/L, no wheezing  Abdomen: Soft, nontender, no distension, firm uterine fundus at umbilicus.  Incision: Clean, dry, and intact, staples in place  Ext: No calf tenderness bilaterally    LABS:         	12.3   12.4  )-----------( 158      ( 2018 07:17 )              36.9       Allergies    No Known Allergies    Intolerances      MEDICATIONS  (STANDING):  diphtheria/tetanus/pertussis (acellular) Vaccine (ADAcel) 0.5 milliLiter(s) IntraMuscular once  ferrous    sulfate 325 milliGRAM(s) Oral daily  heparin  Injectable 5000 Unit(s) SubCutaneous every 12 hours  lactated ringers. 1000 milliLiter(s) (125 mL/Hr) IV Continuous <Continuous>  lactated ringers. 1000 milliLiter(s) (125 mL/Hr) IV Continuous <Continuous>  lactated ringers. 1000 milliLiter(s) (125 mL/Hr) IV Continuous <Continuous>  oxytocin Infusion 41.667 milliUNIT(s)/Min (125 mL/Hr) IV Continuous <Continuous>  oxytocin Infusion 41.667 milliUNIT(s)/Min (125 mL/Hr) IV Continuous <Continuous>  prenatal multivitamin 1 Tablet(s) Oral daily    MEDICATIONS  (PRN):  acetaminophen   Tablet 650 milliGRAM(s) Oral every 6 hours PRN For Temp greater than 38.5 C (101.3 F)  diphenhydrAMINE   Capsule 25 milliGRAM(s) Oral every 6 hours PRN Itching  docusate sodium 100 milliGRAM(s) Oral two times a day PRN Stool Softening  glycerin Suppository - Adult 1 Suppository(s) Rectal at bedtime PRN Constipation  HYDROmorphone  Injectable 1 milliGRAM(s) IV Push every 3 hours PRN Severe Pain  ibuprofen  Tablet 600 milliGRAM(s) Oral every 6 hours PRN Mild pain or headache  lanolin Ointment 1 Application(s) Topical every 3 hours PRN Sore Nipples  naloxone Injectable 0.1 milliGRAM(s) IV Push every 3 minutes PRN For ANY of the following changes in patient status:  A. RR LESS THAN 10 breaths per minute, B. Oxygen saturation LESS THAN 90%, C. Sedation score of 6  ondansetron Injectable 4 milliGRAM(s) IV Push every 6 hours PRN Nausea  oxyCODONE    5 mG/acetaminophen 325 mG 1 Tablet(s) Oral every 3 hours PRN Moderate Pain  oxyCODONE    5 mG/acetaminophen 325 mG 2 Tablet(s) Oral every 6 hours PRN Severe Pain  oxyCODONE    IR 5 milliGRAM(s) Oral every 3 hours PRN Mild Pain  oxyCODONE    IR 10 milliGRAM(s) Oral every 3 hours PRN Moderate Pain  simethicone 80 milliGRAM(s) Chew every 4 hours PRN Gas Postpartum Note,  Section  Patient is a 32y F s/p  POD#3.    Subjective: Patient seen and examined at bedside. No acute events overnight. Pain is controlled with current pain regimen. She is ambulating well and tolerating PO diet/fluids. She reports normal postpartum bleeding. Denies bleeding/discharge at incision site. She is voiding well and reports flatus without yet having a BM. Reports breastfeeding yesterday with some difficulty of baby latching to nipple. Today, has not been able to breastfeed due to difficulty expressing milk. Has been massaging breasts in warm water. Tried using breast pump as well. Had lactation education. Bonding well with baby. Denies fever, headache, changes in vision, nausea, vomiting. Otherwise, patient feels well without additional complaints. Requesting circumcision for baby.    Rubella status: immune  RH status: negative - s/p Rhogam    Physical exam:  Vital Signs Last 24 Hrs  T(C): 36.7 (2018 21:00), Max: 36.7 (2018 21:00)  T(F): 98.1 (2018 21:00), Max: 98.1 (2018 21:00)  HR: 83 (2018 21:00) (83 - 83)  BP: 111/59 (2018 21:00) (111/59 - 111/59)  BP(mean): --  RR: 16 (2018 21:00) (16 - 16)  SpO2: 98% (2018 21:00) (98% - 98%)    Gen: NAD  Breast: Soft, nontender; +engorgement  Cardio: S1,S2 no murmurs  Lungs: CTA B/L, no wheezing  Abdomen: Soft, nontender, no distension, firm uterine fundus at umbilicus.  Incision: Clean, dry, and intact, staples in place  Ext: No calf tenderness bilaterally    LABS:         	12.3   12.4  )-----------( 158      ( 2018 07:17 )              36.9       Allergies    No Known Allergies    Intolerances      MEDICATIONS  (STANDING):  diphtheria/tetanus/pertussis (acellular) Vaccine (ADAcel) 0.5 milliLiter(s) IntraMuscular once  ferrous    sulfate 325 milliGRAM(s) Oral daily  heparin  Injectable 5000 Unit(s) SubCutaneous every 12 hours  lactated ringers. 1000 milliLiter(s) (125 mL/Hr) IV Continuous <Continuous>  lactated ringers. 1000 milliLiter(s) (125 mL/Hr) IV Continuous <Continuous>  lactated ringers. 1000 milliLiter(s) (125 mL/Hr) IV Continuous <Continuous>  oxytocin Infusion 41.667 milliUNIT(s)/Min (125 mL/Hr) IV Continuous <Continuous>  oxytocin Infusion 41.667 milliUNIT(s)/Min (125 mL/Hr) IV Continuous <Continuous>  prenatal multivitamin 1 Tablet(s) Oral daily    MEDICATIONS  (PRN):  acetaminophen   Tablet 650 milliGRAM(s) Oral every 6 hours PRN For Temp greater than 38.5 C (101.3 F)  diphenhydrAMINE   Capsule 25 milliGRAM(s) Oral every 6 hours PRN Itching  docusate sodium 100 milliGRAM(s) Oral two times a day PRN Stool Softening  glycerin Suppository - Adult 1 Suppository(s) Rectal at bedtime PRN Constipation  HYDROmorphone  Injectable 1 milliGRAM(s) IV Push every 3 hours PRN Severe Pain  ibuprofen  Tablet 600 milliGRAM(s) Oral every 6 hours PRN Mild pain or headache  lanolin Ointment 1 Application(s) Topical every 3 hours PRN Sore Nipples  naloxone Injectable 0.1 milliGRAM(s) IV Push every 3 minutes PRN For ANY of the following changes in patient status:  A. RR LESS THAN 10 breaths per minute, B. Oxygen saturation LESS THAN 90%, C. Sedation score of 6  ondansetron Injectable 4 milliGRAM(s) IV Push every 6 hours PRN Nausea  oxyCODONE    5 mG/acetaminophen 325 mG 1 Tablet(s) Oral every 3 hours PRN Moderate Pain  oxyCODONE    5 mG/acetaminophen 325 mG 2 Tablet(s) Oral every 6 hours PRN Severe Pain  oxyCODONE    IR 5 milliGRAM(s) Oral every 3 hours PRN Mild Pain  oxyCODONE    IR 10 milliGRAM(s) Oral every 3 hours PRN Moderate Pain  simethicone 80 milliGRAM(s) Chew every 4 hours PRN Gas      patient without complaints, passing flatus ,tolerating diet  exam benign   discharge home with instructions

## 2018-01-21 NOTE — PROGRESS NOTE ADULT - ASSESSMENT
31 y/o F s/p primary  2/2 breech presentation now POD#3.    -Routine post-op care  -Pain well controlled, continue current pain regimen  -Encouraged ambulation  -Encouraged PO diet/fluids  -Encouraged breastfeeding-lactation education  -Requesting circumcision for baby  -D/C planning today

## 2018-01-21 NOTE — DISCHARGE NOTE OB - MEDICATION SUMMARY - MEDICATIONS TO TAKE
I will START or STAY ON the medications listed below when I get home from the hospital:    ibuprofen 600 mg oral tablet  -- 1 tab(s) by mouth every 6 hours, As Needed -Mild pain- moderate pain  -- Indication: For pain    PreNata oral tablet, chewable  -- 1 tab(s) by mouth once a day  -- Indication: For vitamin    docusate sodium 100 mg oral capsule  -- 1 cap(s) by mouth 2 times a day, As Needed -Stool Softening - for constipation   -- Indication: For constipation

## 2018-01-23 ENCOUNTER — APPOINTMENT (OUTPATIENT)
Dept: ANTEPARTUM | Facility: CLINIC | Age: 33
End: 2018-01-23

## 2018-01-24 ENCOUNTER — APPOINTMENT (OUTPATIENT)
Dept: OBGYN | Facility: CLINIC | Age: 33
End: 2018-01-24
Payer: COMMERCIAL

## 2018-01-24 DIAGNOSIS — Z09 ENCOUNTER FOR FOLLOW-UP EXAMINATION AFTER COMPLETED TREATMENT FOR CONDITIONS OTHER THAN MALIGNANT NEOPLASM: ICD-10-CM

## 2018-01-24 DIAGNOSIS — Z48.02 ENCOUNTER FOR REMOVAL OF SUTURES: ICD-10-CM

## 2018-01-24 PROCEDURE — 99024 POSTOP FOLLOW-UP VISIT: CPT

## 2018-01-26 DIAGNOSIS — O34.03 MATERNAL CARE FOR UNSPECIFIED CONGENITAL MALFORMATION OF UTERUS, THIRD TRIMESTER: ICD-10-CM

## 2018-01-26 DIAGNOSIS — Z3A.36 36 WEEKS GESTATION OF PREGNANCY: ICD-10-CM

## 2018-01-26 DIAGNOSIS — Q51.3 BICORNATE UTERUS: ICD-10-CM

## 2018-03-07 ENCOUNTER — APPOINTMENT (OUTPATIENT)
Dept: OBGYN | Facility: CLINIC | Age: 33
End: 2018-03-07
Payer: COMMERCIAL

## 2018-03-07 VITALS
SYSTOLIC BLOOD PRESSURE: 120 MMHG | WEIGHT: 196 LBS | BODY MASS INDEX: 29.7 KG/M2 | TEMPERATURE: 98.4 F | DIASTOLIC BLOOD PRESSURE: 80 MMHG | HEIGHT: 68 IN

## 2018-03-07 PROCEDURE — 0503F POSTPARTUM CARE VISIT: CPT

## 2018-07-25 PROBLEM — Z78.9 ALCOHOL USE: Status: ACTIVE | Noted: 2017-06-12

## 2018-09-12 ENCOUNTER — APPOINTMENT (OUTPATIENT)
Dept: OBGYN | Facility: CLINIC | Age: 33
End: 2018-09-12

## 2018-09-28 RX ORDER — MISOPROSTOL 200 UG/1
TABLET ORAL
COMMUNITY
Start: 2016-04-25 | End: 2018-10-02

## 2018-09-28 RX ORDER — OXYCODONE HYDROCHLORIDE AND ACETAMINOPHEN 5; 325 MG/1; MG/1
TABLET ORAL
COMMUNITY
Start: 2016-04-25 | End: 2018-10-02

## 2018-10-02 ENCOUNTER — OFFICE VISIT (OUTPATIENT)
Dept: INTERNAL MEDICINE CLINIC | Facility: CLINIC | Age: 33
End: 2018-10-02
Payer: COMMERCIAL

## 2018-10-02 VITALS
WEIGHT: 199.4 LBS | OXYGEN SATURATION: 98 % | BODY MASS INDEX: 30.22 KG/M2 | SYSTOLIC BLOOD PRESSURE: 126 MMHG | HEART RATE: 80 BPM | DIASTOLIC BLOOD PRESSURE: 80 MMHG | HEIGHT: 68 IN

## 2018-10-02 DIAGNOSIS — E66.9 OBESITY (BMI 30-39.9): ICD-10-CM

## 2018-10-02 DIAGNOSIS — Z13.6 SCREENING FOR CARDIOVASCULAR CONDITION: ICD-10-CM

## 2018-10-02 DIAGNOSIS — M54.12 CERVICAL RADICULOPATHY: Primary | ICD-10-CM

## 2018-10-02 PROCEDURE — 99204 OFFICE O/P NEW MOD 45 MIN: CPT | Performed by: INTERNAL MEDICINE

## 2018-10-02 NOTE — PROGRESS NOTES
INTERNAL MEDICINE OFFICE VISIT  [facility] AT [facility]  [address], [place], [address] [facility]  Tel: [phone]      NAME: Cherry Laureano  AGE: 33 y o  SEX: female  : 1985   MRN: [id number]    DATE: 10/2/2018  TIME: 1:28 PM      Assessment and Plan:  1  Cervical radiculopathy  She was told to take ibuprofen as needed and to apply heat on the neck  If it gets worse, she would need muscle relaxant and did some physical therapy done  - CBC and differential; Future  - Comprehensive metabolic panel; Future    2  Obesity (BMI 30-39  9)  She was told to cut down on the calories and increase her activity in order to lose weight    3  Screening for cardiovascular condition    - Lipid panel; Future      - Counseling Documentation: patient was counseled regarding: instructions for management, risk factor reductions, prognosis, patient and family education, impressions, risks and benefits of treatment options and importance of compliance with treatment  - Medication Side Effects: Adverse side effects of medications were reviewed with the patient/guardian today  Return for follow up visit in 1 year or earlier, if needed  Chief Complaint:  Chief Complaint   Patient presents with    Establish Care         History of Present Illness: This is a new patient was here to establish  Neck pain-patient has been working as a  lifting heavy dishes and complains of pain in the back of the neck radiating down to the arms  She gets relief from ibuprofen as needed  Obesity- she delivered it months ago and knows that she has to lose weight  She needs blood work done      Active Problem List:  Patient Active Problem List   Diagnosis    Bicornuate uterus    Recurrent pregnancy loss    Cervical radiculopathy    Obesity (BMI 30-39  9)         Past Medical History:  Past Medical History:   Diagnosis Date    , spontaneous complete     Missed  2016 Transitioned From: , spontaneous threatened    Tubal pregnancy          Past Surgical History:  Past Surgical History:   Procedure Laterality Date    INDUCED       LAPAROSCOPY      TONSILLECTOMY           Family History:  History reviewed  No pertinent family history  Social History:  Social History     Social History    Marital status: /Civil Union     Spouse name: N/A    Number of children: N/A    Years of education: N/A     Social History Main Topics    Smoking status: Current Every Day Smoker    Smokeless tobacco: Never Used    Alcohol use Yes      Comment: Social    Drug use: No    Sexual activity: Yes     Partners: Male     Other Topics Concern    None     Social History Narrative    None         Allergies:  No Known Allergies      Medications:  No current outpatient prescriptions on file        The following portions of the patient's history were reviewed and updated as appropriate: past medical history, past surgical history, family history, social history, allergies, current medications and active problem list       Review of Systems:  Constitutional: Denies fever, chills, weight gain, weight loss, fatigue  Eyes: Denies eye redness, eye discharge, double vision, change in visual acuity  ENT: Denies hearing loss, tinnitus, sneezing, nasal congestion, nasal discharge, sore throat   Respiratory: Denies cough, expectoration, hemoptysis, shortness of breath, wheezing  Cardiovascular: Denies chest pain, palpitations, lower extremity swelling, orthopnea, PND  Gastrointestinal: Denies abdominal pain, heartburn, nausea, vomiting, hematemesis, diarrhea, bloody stools  Genito-Urinary: Denies dysuria, frequency, difficulty in micturition, nocturia, incontinence  Musculoskeletal: has neck pain radiating to left arm  Neurologic: Denies confusion, lightheadedness, syncope, headache, focal weakness, sensory changes, seizures  Endocrine: Denies polyuria, polydipsia, temperature intolerance  Allergy and Immunology: Denies hives, insect bite sensitivity  Hematological and Lymphatic: Denies bleeding problems, swollen glands   Psychological: Denies depression, suicidal ideation, anxiety, panic, mood swings  Dermatological: Denies pruritus, rash, skin lesion changes      Vitals:  Vitals:    10/02/18 1308   BP: 126/80   Pulse: 80   SpO2: 98%       Body mass index is 30 77 kg/m²  Weight (last 2 days)     Date/Time   Weight    10/02/18 1308  90 4 (199 4)                Physical Examination:  General: Patient is not in acute distress  Awake, alert, responding to commands  No weight gain or loss  Head: Normocephalic  Atraumatic  Eyes: Conjunctiva and lids with no swelling, erythema or discharge  Both pupils normal sized, round and reactive to light  Sclera nonicteric  ENT: External examination of nose and ear normal  Otoscopic examination shows translucent tympanic membranes with patent canals without erythema  Oropharynx moist with no erythema, edema, exudate or lesions  Neck: Supple  JVP not raised  Trachea midline  No masses  No thyromegaly  Lungs: No signs of increased work of breathing or respiratory distress  Bilateral bronchovascular breath sounds with no crackles or rhonchi  Chest wall: No tenderness  Cardiovascular: Normal PMI  No thrills  Regular rate and rhythm  S1 and S2 normal  No murmur, rub or gallop  Gastrointestinal: Abdomen soft, nontender  No guarding or rigidity  Liver and spleen not palpable  Bowel sounds present  Neurologic: Cranial nerves II-XII intact   Cortical functions normal  Motor system - Reflexes 2+ and symmetrical  Sensations normal  Musculoskeletal: Gait normal  No joint tenderness  Integumentary: Skin normal with no rash or lesions  Lymphatic: No palpable lymph nodes in neck, axilla or groin  Extremities: No clubbing, cyanosis, edema or varicosities  Psychological: Judgement and insight normal  Mood and affect normal      Laboratory Results:  CBC with diff:   No results found for: WBC, RBC, HGB, HCT, MCV, MCH, RDW, PLT    CMP:  No results found for: CREATININE, BUN, NA, K, CL, CO2, GLUCOSE, PROT, ALKPHOS, ALT, AST, BILIDIR    No results found for: HGBA1C, MG, PHOS    No results found for: TROPONINI, CKMB, CKTOTAL    Lipid Profile:   No results found for: CHOL  No results found for: HDL  No results found for: LDLCALC  No results found for: TRIG      Health Maintenance:  Health Maintenance   Topic Date Due    Depression Screening PHQ  1985    DTaP,Tdap,and Td Vaccines (1 - Tdap) 11/25/2007    PAP SMEAR  11/25/2007    INFLUENZA VACCINE  09/01/2018       There is no immunization history on file for this patient        Nolberto Ruiz MD  10/2/2018,1:28 PM

## 2019-01-03 ENCOUNTER — OFFICE VISIT (OUTPATIENT)
Dept: INTERNAL MEDICINE CLINIC | Facility: CLINIC | Age: 34
End: 2019-01-03
Payer: COMMERCIAL

## 2019-01-03 VITALS
DIASTOLIC BLOOD PRESSURE: 76 MMHG | OXYGEN SATURATION: 98 % | TEMPERATURE: 99.6 F | HEART RATE: 98 BPM | BODY MASS INDEX: 29.07 KG/M2 | HEIGHT: 68 IN | SYSTOLIC BLOOD PRESSURE: 140 MMHG | WEIGHT: 191.8 LBS

## 2019-01-03 DIAGNOSIS — M54.12 CERVICAL RADICULOPATHY: Primary | ICD-10-CM

## 2019-01-03 PROCEDURE — 99213 OFFICE O/P EST LOW 20 MIN: CPT | Performed by: INTERNAL MEDICINE

## 2019-01-03 PROCEDURE — 3008F BODY MASS INDEX DOCD: CPT | Performed by: INTERNAL MEDICINE

## 2019-01-03 RX ORDER — METHOCARBAMOL 500 MG/1
500 TABLET, FILM COATED ORAL 2 TIMES DAILY
Qty: 20 TABLET | Refills: 0 | Status: SHIPPED | OUTPATIENT
Start: 2019-01-03 | End: 2019-05-17

## 2019-01-03 NOTE — PROGRESS NOTES
INTERNAL MEDICINE FOLLOW-UP OFFICE VISIT  [facility] AT [facility]    NAME: Nidhi Drew  AGE: 29 y o  SEX: female  : 1985   MRN: [id number]    DATE: 1/3/2019  TIME: 5:12 PM    Assessment and Plan     Diagnoses and all orders for this visit:    Cervical radiculopathy  -     methocarbamol (ROBAXIN) 500 mg tablet; Take 1 tablet (500 mg total) by mouth 2 (two) times a day    She was told to take ibuprofen and methocarbamol and apply heat to the neck  If the the neck stiffness does not get better in 2 days or she develops high-grade fever, she was told to go to the ER     - Counseling Documentation: patient was counseled regarding: instructions for management, risk factor reductions, prognosis, patient and family education, impressions, risks and benefits of treatment options and importance of compliance with treatment  - Medication Side Effects: Adverse side effects of medications were reviewed with the patient/guardian today  Return to office in: as needed    Chief Complaint     Chief Complaint   Patient presents with    Sore Throat    Torticollis       History of Present Illness     Neck Pain    This is a new problem  The current episode started in the past 7 days  The problem occurs constantly  The problem has been unchanged  The pain is present in the left side  The quality of the pain is described as aching  The pain is at a severity of 6/10  The pain is moderate  The symptoms are aggravated by bending and twisting  Pertinent negatives include no chest pain, fever, headaches, numbness or weakness  She has tried NSAIDs and heat for the symptoms  The treatment provided mild relief         The following portions of the patient's history were reviewed and updated as appropriate: allergies, current medications, past family history, past medical history, past social history, past surgical history and problem list     Review of Systems     Review of Systems   Constitutional: Negative for chills, diaphoresis, fatigue and fever  HENT: Negative for congestion, ear discharge, ear pain, hearing loss, postnasal drip, rhinorrhea, sinus pain, sinus pressure, sneezing, sore throat and voice change  Eyes: Negative for pain, discharge, redness and visual disturbance  Respiratory: Negative for cough, chest tightness, shortness of breath and wheezing  Cardiovascular: Negative for chest pain, palpitations and leg swelling  Gastrointestinal: Negative for abdominal distention, abdominal pain, blood in stool, constipation, diarrhea, nausea and vomiting  Endocrine: Negative for cold intolerance, heat intolerance, polydipsia, polyphagia and polyuria  Genitourinary: Negative for dysuria, flank pain, frequency, hematuria and urgency  Musculoskeletal: Positive for neck pain  Negative for arthralgias, back pain, gait problem, joint swelling, myalgias and neck stiffness  Skin: Negative for rash  Neurological: Negative for dizziness, tremors, syncope, facial asymmetry, speech difficulty, weakness, light-headedness, numbness and headaches  Hematological: Does not bruise/bleed easily  Psychiatric/Behavioral: Negative for behavioral problems, confusion and sleep disturbance  The patient is not nervous/anxious  Active Problem List     Patient Active Problem List   Diagnosis    Bicornuate uterus    Recurrent pregnancy loss    Cervical radiculopathy    Obesity (BMI 30-39  9)       Objective     /76   Pulse 98   Temp 99 6 °F (37 6 °C)   Ht 5' 7 5" (1 715 m)   Wt 87 kg (191 lb 12 8 oz)   SpO2 98%   BMI 29 60 kg/m²     Physical Exam   Constitutional: She is oriented to person, place, and time  She appears well-developed and well-nourished  No distress  HENT:   Head: Normocephalic and atraumatic     Right Ear: External ear normal    Left Ear: External ear normal    Nose: Nose normal    Mouth/Throat: Oropharynx is clear and moist    Eyes: Conjunctivae and EOM are normal  Right eye exhibits no discharge  Left eye exhibits no discharge  No scleral icterus  Neck: Normal range of motion  Neck supple  No JVD present  No tracheal deviation present  No thyromegaly present  Cardiovascular: Normal rate, regular rhythm, normal heart sounds and intact distal pulses  Exam reveals no gallop and no friction rub  No murmur heard  Pulmonary/Chest: Effort normal and breath sounds normal  No respiratory distress  She has no wheezes  She has no rales  She exhibits no tenderness  Abdominal: Soft  Bowel sounds are normal  She exhibits no distension  There is no tenderness  There is no rebound and no guarding  Musculoskeletal: Normal range of motion  She exhibits tenderness  She exhibits no edema  Tenderness in the left side of neck   Lymphadenopathy:     She has no cervical adenopathy  Neurological: She is alert and oriented to person, place, and time  No cranial nerve deficit  She exhibits normal muscle tone  Coordination normal    Skin: Skin is warm and dry  No rash noted  She is not diaphoretic  No erythema  Psychiatric: She has a normal mood and affect  Judgment normal            Current Medications       Current Outpatient Prescriptions:     methocarbamol (ROBAXIN) 500 mg tablet, Take 1 tablet (500 mg total) by mouth 2 (two) times a day, Disp: 20 tablet, Rfl: 0    Health Maintenance     Health Maintenance   Topic Date Due    Pneumococcal PPSV23 Medium Risk Adult (1 of 1 - PPSV23) 11/25/2005    DTaP,Tdap,and Td Vaccines (1 - Tdap) 11/25/2007    PAP SMEAR  11/25/2007    INFLUENZA VACCINE  07/01/2018    Depression Screening PHQ  10/02/2019       There is no immunization history on file for this patient        MD ABEL Hudson of BEHAVIORAL MEDICINE AT [facility]

## 2019-05-15 ENCOUNTER — TELEPHONE (OUTPATIENT)
Dept: INTERNAL MEDICINE CLINIC | Facility: CLINIC | Age: 34
End: 2019-05-15

## 2019-05-17 ENCOUNTER — OFFICE VISIT (OUTPATIENT)
Dept: INTERNAL MEDICINE CLINIC | Facility: CLINIC | Age: 34
End: 2019-05-17
Payer: COMMERCIAL

## 2019-05-17 VITALS
HEART RATE: 76 BPM | BODY MASS INDEX: 27.07 KG/M2 | OXYGEN SATURATION: 98 % | SYSTOLIC BLOOD PRESSURE: 108 MMHG | HEIGHT: 68 IN | WEIGHT: 178.6 LBS | DIASTOLIC BLOOD PRESSURE: 76 MMHG

## 2019-05-17 DIAGNOSIS — F17.210 CIGARETTE NICOTINE DEPENDENCE WITHOUT COMPLICATION: ICD-10-CM

## 2019-05-17 DIAGNOSIS — R91.1 SOLITARY LUNG NODULE: Primary | ICD-10-CM

## 2019-05-17 PROCEDURE — 3008F BODY MASS INDEX DOCD: CPT | Performed by: INTERNAL MEDICINE

## 2019-05-17 PROCEDURE — 99213 OFFICE O/P EST LOW 20 MIN: CPT | Performed by: INTERNAL MEDICINE

## 2019-05-17 RX ORDER — IBUPROFEN 600 MG/1
TABLET ORAL
Refills: 0 | COMMUNITY
Start: 2019-05-12

## 2019-09-02 PROBLEM — Z09 FOLLOW UP: Status: RESOLVED | Noted: 2017-06-12 | Resolved: 2019-09-02

## 2020-02-06 ENCOUNTER — OUTPATIENT (OUTPATIENT)
Dept: INPATIENT UNIT | Facility: HOSPITAL | Age: 35
LOS: 1 days | Discharge: HOME | End: 2020-02-06
Payer: COMMERCIAL

## 2020-02-06 DIAGNOSIS — O26.899 OTHER SPECIFIED PREGNANCY RELATED CONDITIONS, UNSPECIFIED TRIMESTER: ICD-10-CM

## 2020-02-06 LAB
ALBUMIN SERPL ELPH-MCNC: 2.9 G/DL — LOW (ref 3.3–5)
ALP SERPL-CCNC: 66 U/L — SIGNIFICANT CHANGE UP (ref 40–120)
ALT FLD-CCNC: 19 U/L — SIGNIFICANT CHANGE UP (ref 12–78)
ANION GAP SERPL CALC-SCNC: 6 MMOL/L — SIGNIFICANT CHANGE UP (ref 5–17)
APPEARANCE UR: CLEAR — SIGNIFICANT CHANGE UP
AST SERPL-CCNC: 15 U/L — SIGNIFICANT CHANGE UP (ref 15–37)
BASOPHILS # BLD AUTO: 0.04 K/UL — SIGNIFICANT CHANGE UP (ref 0–0.2)
BASOPHILS NFR BLD AUTO: 0.5 % — SIGNIFICANT CHANGE UP (ref 0–2)
BILIRUB SERPL-MCNC: 0.3 MG/DL — SIGNIFICANT CHANGE UP (ref 0.2–1.2)
BILIRUB UR-MCNC: NEGATIVE — SIGNIFICANT CHANGE UP
BUN SERPL-MCNC: 9 MG/DL — SIGNIFICANT CHANGE UP (ref 7–23)
CALCIUM SERPL-MCNC: 9 MG/DL — SIGNIFICANT CHANGE UP (ref 8.5–10.1)
CHLORIDE SERPL-SCNC: 107 MMOL/L — SIGNIFICANT CHANGE UP (ref 96–108)
CO2 SERPL-SCNC: 26 MMOL/L — SIGNIFICANT CHANGE UP (ref 22–31)
COLOR SPEC: YELLOW — SIGNIFICANT CHANGE UP
CREAT SERPL-MCNC: 0.57 MG/DL — SIGNIFICANT CHANGE UP (ref 0.5–1.3)
DIFF PNL FLD: NEGATIVE — SIGNIFICANT CHANGE UP
EOSINOPHIL # BLD AUTO: 0.08 K/UL — SIGNIFICANT CHANGE UP (ref 0–0.5)
EOSINOPHIL NFR BLD AUTO: 1.1 % — SIGNIFICANT CHANGE UP (ref 0–6)
FIBRONECTIN FETAL SPEC QL: NEGATIVE — SIGNIFICANT CHANGE UP
GLUCOSE SERPL-MCNC: 81 MG/DL — SIGNIFICANT CHANGE UP (ref 70–99)
GLUCOSE UR QL: NEGATIVE MG/DL — SIGNIFICANT CHANGE UP
HCT VFR BLD CALC: 36.3 % — SIGNIFICANT CHANGE UP (ref 34.5–45)
HGB BLD-MCNC: 12.1 G/DL — SIGNIFICANT CHANGE UP (ref 11.5–15.5)
IMM GRANULOCYTES NFR BLD AUTO: 1.2 % — SIGNIFICANT CHANGE UP (ref 0–1.5)
KETONES UR-MCNC: ABNORMAL
LEUKOCYTE ESTERASE UR-ACNC: NEGATIVE — SIGNIFICANT CHANGE UP
LYMPHOCYTES # BLD AUTO: 1.27 K/UL — SIGNIFICANT CHANGE UP (ref 1–3.3)
LYMPHOCYTES # BLD AUTO: 17.1 % — SIGNIFICANT CHANGE UP (ref 13–44)
MCHC RBC-ENTMCNC: 30.5 PG — SIGNIFICANT CHANGE UP (ref 27–34)
MCHC RBC-ENTMCNC: 33.3 GM/DL — SIGNIFICANT CHANGE UP (ref 32–36)
MCV RBC AUTO: 91.4 FL — SIGNIFICANT CHANGE UP (ref 80–100)
MONOCYTES # BLD AUTO: 0.83 K/UL — SIGNIFICANT CHANGE UP (ref 0–0.9)
MONOCYTES NFR BLD AUTO: 11.2 % — SIGNIFICANT CHANGE UP (ref 2–14)
NEUTROPHILS # BLD AUTO: 5.1 K/UL — SIGNIFICANT CHANGE UP (ref 1.8–7.4)
NEUTROPHILS NFR BLD AUTO: 68.9 % — SIGNIFICANT CHANGE UP (ref 43–77)
NITRITE UR-MCNC: NEGATIVE — SIGNIFICANT CHANGE UP
PH UR: 5 — SIGNIFICANT CHANGE UP (ref 5–8)
PLATELET # BLD AUTO: 162 K/UL — SIGNIFICANT CHANGE UP (ref 150–400)
POTASSIUM SERPL-MCNC: 3.6 MMOL/L — SIGNIFICANT CHANGE UP (ref 3.5–5.3)
POTASSIUM SERPL-SCNC: 3.6 MMOL/L — SIGNIFICANT CHANGE UP (ref 3.5–5.3)
PROT SERPL-MCNC: 6.9 GM/DL — SIGNIFICANT CHANGE UP (ref 6–8.3)
PROT UR-MCNC: NEGATIVE MG/DL — SIGNIFICANT CHANGE UP
RBC # BLD: 3.97 M/UL — SIGNIFICANT CHANGE UP (ref 3.8–5.2)
RBC # FLD: 12.8 % — SIGNIFICANT CHANGE UP (ref 10.3–14.5)
SODIUM SERPL-SCNC: 139 MMOL/L — SIGNIFICANT CHANGE UP (ref 135–145)
SP GR SPEC: 1.01 — SIGNIFICANT CHANGE UP (ref 1.01–1.02)
UROBILINOGEN FLD QL: NEGATIVE MG/DL — SIGNIFICANT CHANGE UP
WBC # BLD: 7.41 K/UL — SIGNIFICANT CHANGE UP (ref 3.8–10.5)
WBC # FLD AUTO: 7.41 K/UL — SIGNIFICANT CHANGE UP (ref 3.8–10.5)

## 2020-02-06 PROCEDURE — 76817 TRANSVAGINAL US OBSTETRIC: CPT

## 2020-02-06 PROCEDURE — 76805 OB US >/= 14 WKS SNGL FETUS: CPT | Mod: 26

## 2020-02-06 PROCEDURE — 76700 US EXAM ABDOM COMPLETE: CPT

## 2020-02-06 PROCEDURE — 76700 US EXAM ABDOM COMPLETE: CPT | Mod: 26

## 2020-02-06 PROCEDURE — G0463: CPT

## 2020-02-06 PROCEDURE — 82731 ASSAY OF FETAL FIBRONECTIN: CPT

## 2020-02-06 PROCEDURE — 96360 HYDRATION IV INFUSION INIT: CPT

## 2020-02-06 PROCEDURE — 59025 FETAL NON-STRESS TEST: CPT

## 2020-02-06 PROCEDURE — 76817 TRANSVAGINAL US OBSTETRIC: CPT | Mod: 26

## 2020-02-06 PROCEDURE — 87086 URINE CULTURE/COLONY COUNT: CPT

## 2020-02-06 PROCEDURE — 85025 COMPLETE CBC W/AUTO DIFF WBC: CPT

## 2020-02-06 PROCEDURE — 76805 OB US >/= 14 WKS SNGL FETUS: CPT

## 2020-02-06 PROCEDURE — 81003 URINALYSIS AUTO W/O SCOPE: CPT

## 2020-02-06 PROCEDURE — 36415 COLL VENOUS BLD VENIPUNCTURE: CPT

## 2020-02-06 PROCEDURE — 80053 COMPREHEN METABOLIC PANEL: CPT

## 2020-02-06 RX ORDER — SODIUM CHLORIDE 9 MG/ML
1000 INJECTION, SOLUTION INTRAVENOUS ONCE
Refills: 0 | Status: COMPLETED | OUTPATIENT
Start: 2020-02-06 | End: 2020-02-06

## 2020-02-06 RX ADMIN — SODIUM CHLORIDE 1000 MILLILITER(S): 9 INJECTION, SOLUTION INTRAVENOUS at 20:00

## 2020-02-07 DIAGNOSIS — O47.9 FALSE LABOR, UNSPECIFIED: ICD-10-CM

## 2020-02-08 LAB
CULTURE RESULTS: SIGNIFICANT CHANGE UP
SPECIMEN SOURCE: SIGNIFICANT CHANGE UP

## 2020-07-07 ENCOUNTER — TELEPHONE (OUTPATIENT)
Dept: INTERNAL MEDICINE CLINIC | Facility: CLINIC | Age: 35
End: 2020-07-07

## 2020-07-08 ENCOUNTER — OFFICE VISIT (OUTPATIENT)
Dept: INTERNAL MEDICINE CLINIC | Facility: CLINIC | Age: 35
End: 2020-07-08
Payer: COMMERCIAL

## 2020-07-08 VITALS
SYSTOLIC BLOOD PRESSURE: 112 MMHG | HEIGHT: 68 IN | WEIGHT: 194 LBS | HEART RATE: 86 BPM | BODY MASS INDEX: 29.4 KG/M2 | TEMPERATURE: 98.5 F | OXYGEN SATURATION: 99 % | DIASTOLIC BLOOD PRESSURE: 64 MMHG

## 2020-07-08 DIAGNOSIS — Z13.6 SCREENING FOR CARDIOVASCULAR CONDITION: ICD-10-CM

## 2020-07-08 DIAGNOSIS — R91.1 SOLITARY LUNG NODULE: ICD-10-CM

## 2020-07-08 DIAGNOSIS — R53.83 FATIGUE, UNSPECIFIED TYPE: ICD-10-CM

## 2020-07-08 DIAGNOSIS — E66.9 OBESITY (BMI 30-39.9): ICD-10-CM

## 2020-07-08 DIAGNOSIS — M54.12 CERVICAL RADICULOPATHY: Primary | ICD-10-CM

## 2020-07-08 PROBLEM — F17.210 CIGARETTE NICOTINE DEPENDENCE WITHOUT COMPLICATION: Status: RESOLVED | Noted: 2019-05-17 | Resolved: 2020-07-08

## 2020-07-08 PROCEDURE — 1036F TOBACCO NON-USER: CPT | Performed by: INTERNAL MEDICINE

## 2020-07-08 PROCEDURE — 3008F BODY MASS INDEX DOCD: CPT | Performed by: INTERNAL MEDICINE

## 2020-07-08 PROCEDURE — 99214 OFFICE O/P EST MOD 30 MIN: CPT | Performed by: INTERNAL MEDICINE

## 2020-07-08 NOTE — PROGRESS NOTES
INTERNAL MEDICINE OFFICE VISIT  [facility] [address], [address]  Tel: [phone]      NAME: Latonia Ventura  AGE: 36 y o  SEX: female  : 1985   MRN: [id number]    DATE: 2020  TIME: 4:13 PM      Assessment and Plan:  1  Cervical radiculopathy   she was advised to do neck exercises  Will get back to her with the results of the x-ray of the neck  She did not want to go for physical therapy  - XR spine cervical complete 4 or 5 vw non injury; Future    2  Solitary lung nodule  Will get back to her with the results of the CT chest  - CT chest wo contrast; Future    3  Fatigue, unspecified type   follow-up lab results  - CBC and differential; Future  - Comprehensive metabolic panel; Future  - TSH, 3rd generation; Future    4  Obesity (BMI 30-39  9)  BMI Counseling: Body mass index is 29 5 kg/m²  The BMI is above normal  Nutrition recommendations include decreasing portion sizes, encouraging healthy choices of fruits and vegetables and moderation in carbohydrate intake  Exercise recommendations include moderate physical activity 150 minutes/week  No pharmacotherapy was ordered  5  Screening for cardiovascular condition    - Lipid panel; Future      - Counseling Documentation: patient was counseled regarding: diagnostic results, instructions for management, risk factor reductions, prognosis, patient and family education, risks and benefits of treatment options and importance of compliance with treatment  - Medication Side Effects: Adverse side effects of medications were reviewed with the patient/guardian today  Return for follow up visit in  As needed or earlier, if needed        Chief Complaint:  Chief Complaint   Patient presents with    Follow-up     routine [id number] to review         History of Present Illness:    neck pain-  Patient has pain in her neck and during the night when she is sleeping she has moderate to severe pain in both her upper arms  The pain gets better as the day goes on  Lung nodule-she was told to get a CT of the chest a year ago but she did not do so  Will tell her to get the CT done now   Fatigue -she has fatigue and tiredness most of the day   Obesity -she was told to try to lose weight      Active Problem List:  Patient Active Problem List   Diagnosis    Bicornuate uterus    Recurrent pregnancy loss    Cervical radiculopathy    Obesity (BMI 30-39  9)    Solitary lung nodule         Past Medical History:  Past Medical History:   Diagnosis Date    , spontaneous complete     Cigarette nicotine dependence without complication 9998    Missed  2016    Transitioned From: , spontaneous threatened    Tubal pregnancy          Past Surgical History:  Past Surgical History:   Procedure Laterality Date     SECTION      INDUCED       LAPAROSCOPY      TONSILLECTOMY           Family History:  Family History   Problem Relation Age of Onset    Thyroid disease Mother          Social History:  Social History     Socioeconomic History    Marital status: /Civil Union     Spouse name: None    Number of children: None    Years of education: None    Highest education level: None   Occupational History    None   Social Needs    Financial resource strain: None    Food insecurity:     Worry: None     Inability: None    Transportation needs:     Medical: None     Non-medical: None   Tobacco Use    Smoking status: Former Smoker     Packs/day: 1 00     Years: 10 00     Pack years: 10 00     Types: Cigarettes     Last attempt to quit: 2019     Years since quittin 1    Smokeless tobacco: Never Used   Substance and Sexual Activity    Alcohol use: Yes     Comment: Social    Drug use: No    Sexual activity: Yes     Partners: Male   Lifestyle    Physical activity:     Days per week: 3 days     Minutes per session: 30 min    Stress: Only a little   Relationships    Social connections:     Talks on phone: None     Gets together: None     Attends Restorationist service: None     Active member of club or organization: None     Attends meetings of clubs or organizations: None     Relationship status: None    Intimate partner violence:     Fear of current or ex partner: None     Emotionally abused: None     Physically abused: None     Forced sexual activity: None   Other Topics Concern    None   Social History Narrative    None         Allergies:  No Known Allergies      Medications:    Current Outpatient Medications:     ibuprofen (MOTRIN) 600 mg tablet, TAKE ONE TABLET BY MOUTH EVERY 6 HOURS AS NEEDED WITH FOOD, Disp: , Rfl: 0      The following portions of the patient's history were reviewed and updated as appropriate: past medical history, past surgical history, family history, social history, allergies, current medications and active problem list       Review of Systems:  Constitutional: Denies fever, chills, weight gain, weight loss, fatigue  Eyes: Denies eye redness, eye discharge, double vision, change in visual acuity  ENT: Denies hearing loss, tinnitus, sneezing, nasal congestion, nasal discharge, sore throat   Respiratory: Denies cough, expectoration, hemoptysis, shortness of breath, wheezing  Cardiovascular: Denies chest pain, palpitations, lower extremity swelling, orthopnea, PND  Gastrointestinal: Denies abdominal pain, heartburn, nausea, vomiting, hematemesis, diarrhea, bloody stools  Genito-Urinary: Denies dysuria, frequency, difficulty in micturition, nocturia, incontinence  Musculoskeletal: Denies back pain, joint pain,  Complains of pain in both the arms when she is sleeping at night  Neurologic: Denies confusion, lightheadedness, syncope, headache, focal weakness, sensory changes, seizures  Endocrine: Denies polyuria, polydipsia, temperature intolerance  Allergy and Immunology: Denies hives, insect bite sensitivity  Hematological and Lymphatic: Denies bleeding problems, swollen glands   Psychological: Denies depression, suicidal ideation, anxiety, panic, mood swings  Dermatological: Denies pruritus, rash, skin lesion changes      Vitals:  Vitals:    07/08/20 1551   BP: 112/64   Pulse: 86   Temp: 98 5 °F (36 9 °C)   SpO2: 99%       Body mass index is 29 5 kg/m²  Weight (last 2 days)     Date/Time   Weight    07/08/20 1551   88 (194)                Physical Examination:  General: Patient is not in acute distress  Awake, alert, responding to commands  No weight gain or loss  Head: Normocephalic  Atraumatic  Eyes: Conjunctiva and lids with no swelling, erythema or discharge  Both pupils normal sized, round and reactive to light  Sclera nonicteric  ENT: External examination of nose and ear normal  Otoscopic examination shows translucent tympanic membranes with patent canals without erythema  Oropharynx moist with no erythema, edema, exudate or lesions  Neck: Supple  JVP not raised  Trachea midline  No masses  No thyromegaly  Lungs: No signs of increased work of breathing or respiratory distress  Bilateral bronchovascular breath sounds with no crackles or rhonchi  Chest wall: No tenderness  Cardiovascular: Normal PMI  No thrills  Regular rate and rhythm  S1 and S2 normal  No murmur, rub or gallop  Gastrointestinal: Abdomen soft, nontender  No guarding or rigidity  Liver and spleen not palpable  Bowel sounds present  Neurologic: Cranial nerves II-XII intact   Cortical functions normal  Motor system - Reflexes 2+ and symmetrical  Sensations normal  Musculoskeletal: Gait normal  No joint tenderness  Integumentary: Skin normal with no rash or lesions  Lymphatic: No palpable lymph nodes in neck, axilla or groin  Extremities: No clubbing, cyanosis, edema or varicosities  Psychological: Judgement and insight normal  Mood and affect normal      Laboratory Results:  CBC with diff:   No results found for: WBC, RBC, HGB, HCT, MCV, MCH, RDW, PLT    CMP:  No results found for: CREATININE, BUN, NA, K, CL, CO2, GLUCOSE, PROT, ALKPHOS, ALT, AST, BILIDIR    No results found for: HGBA1C, MG, PHOS    No results found for: TROPONINI, CKMB, CKTOTAL    Lipid Profile:   No results found for: CHOL  No results found for: HDL  No results found for: LDLCALC  No results found for: TRIG    Imaging Results:  MRI pelvis female wo and w contrast  Narrative: MRI OF THE PELVIS WITH AND WITHOUT CONTRAST (GYNECOLOGIC)    INDICATION:  Bicornuate uterus  COMPARISON: None  TECHNIQUE: The following pulse sequences were obtained on a 1 5 T scanner:  Axial T1, axial and sagittal T2, coronal T2 with fat saturation, pre-contrast axial T1 with fat saturation, post-contrast axial and sagittal T1 with fat saturation  7 mL of   Gadavist intravenous gadolinium was administered without immediate complication  FINDINGS:    UTERUS:   Morphology: Bicornuate uterus  The two uterine cavities communicate superior to the cervix with no septum (partial bicornuate uterus) Single cervix  Myometrium: Normal signal and enhancement  No myometrial masses  Normal junctional zone  Cervix: Single cervix as described above  No evidence of mass  Endometrium: Normal signal with normal maximal thickness of 5 mm  No mass, fluid collection or abnormal enhancement  ADNEXA:    Right:  Right ovary enlarged, measuring 4 3 x 2 6 x 4 5 cm with a volume of 30 mL  Innumerable peripheral subcentimeter follicles as well as a single 1 8 cm follicle  No adnexal mass identified  No evidence of hydrosalpinx  Left:  Left ovary enlarged, measuring 4 2 x 4 5 x 3 2 cm with a volume of 32 mL  Numerous peripheral subcentimeter sized follicles  Dominant 2 7 cm cyst    No adnexal mass identified  No evidence of hydrosalpinx  BLADDER: Normal     PELVIC CAVITY:  Small amount of free fluid in the posterior cul-de-sac  No evidence of lymphadenopathy or mass      BOWEL:  Imaged portions of the small intestine and colon unremarkable  OSSEOUS STRUCTURES:   Normal marrow signal   No evidence of fracture or marrow replacing process  VASCULAR STRUCTURES:  Visualized vasculature is normal     PELVIC WALL:  Normal   Impression: 1  Partial bicornuate uterus unicollis  2   Bilateral enlarged ovaries with numerous peripheral follicles  Although the appearance is nonspecific, it is consistent with polycystic ovaries  Please note that polycystic ovary syndrome is a clinical diagnosis  3   Small amount of free fluid in the cul-de-sac  Workstation performed: [id number]  MRI abdomen w wo contrast  Narrative: MRI - ABDOMEN - WITH AND WITHOUT CONTRAST    INDICATION:  27-year-old woman with bicornuate uterus and recurrent pregnancy loss  Evaluate for renal anomaly  COMPARISON: None    TECHNIQUE:  The following pulse sequences were obtained on a 1 5 T scanner prior to and following the administration of intravenous contrast:     Coronal and axial T2 with TE of 90 and 180 respectively, axial T2 with fat saturation, axial FIESTA fat-sat,   axial T1-weighted in-and-out-of phase, DWI, precontrast axial T1 with fat saturation, post-contrast dynamic axial T1 with fat saturation at 20, 70, and 180 seconds, coronal T1  with fat saturation and 7 minute delayed axial T1 with fat saturation  7 mL   of Gadavist gadolinium was administered intravenously without immediate complication  FINDINGS:    LIVER:  Normal in size, signal and enhancement  No signal loss on opposed phase images  No masses  BILIARY TREE:  There is no intra-hepatic biliary ductal dilatation  The common bile duct is normal in caliber  There is no gross evidence of mass or choledocholithiasis  GALLBLADDER:  Normal   No calculus or wall thickening  PANCREAS: Normal   No mass  Pancreatic duct normal in caliber  ADRENAL GLANDS:  Normal     SPLEEN:  Normal     KIDNEYS:  Normal and symmetric in size, shape and enhancement  Normal in location    No hydronephrosis or ureterectasis  No anomalies  Symmetric contrast excretion on delayed images  LUNG BASES:   Image portions of the lower chest unremarkable       ABDOMINAL CAVITY: No lymphadenopathy or mass  No ascites  ABDOMINAL WALL:  Small fat-containing umbilical hernia  Abdominal wall otherwise intact  BOWEL:   Stomach and included portions of the small intestine and colon unremarkable  OSSEOUS STRUCTURES:  Normal marrow signal and enhancement  No evidence of fracture or marrow replacing process  VASCULAR STRUCTURES:  Normal   Impression: Normal MRI of the abdomen  No evidence of renal anomaly      Workstation performed: [id number]       Health Maintenance:  Health Maintenance   Topic Date Due    HIV Screening  11/25/2001    BMI: Followup Plan  11/25/2004    Annual Physical  11/25/2004    Influenza Vaccine  07/01/2020    Depression Screening PHQ  07/08/2021    BMI: Adult  07/08/2021    Cervical Cancer Screening  05/15/2024    DTaP,Tdap,and Td Vaccines (2 - Td) 02/05/2030    Pneumococcal Vaccine: 65+ Years (1 of 2 - PCV13) 11/25/2051    Pneumococcal Vaccine: Pediatrics (0 to 5 Years) and At-Risk Patients (6 to 59 Years)  Aged Out    HIB Vaccine  Aged Out    Hepatitis B Vaccine  Aged Out    IPV Vaccine  Aged Out    Hepatitis A Vaccine  Aged Out    Meningococcal ACWY Vaccine  Aged Out    HPV Vaccine  Aged [employer] History   Administered Date(s) Administered    Fluzone Split Quad 0 5 mL 02/05/2020    Tdap 02/05/2020         Veronica Ugalde MD  7/8/2020,4:13 PM

## 2020-08-08 ENCOUNTER — HOSPITAL ENCOUNTER (OUTPATIENT)
Dept: CT IMAGING | Facility: HOSPITAL | Age: 35
Discharge: HOME | End: 2020-08-08
Attending: INTERNAL MEDICINE
Payer: COMMERCIAL

## 2020-08-08 ENCOUNTER — HOSPITAL ENCOUNTER (OUTPATIENT)
Dept: RADIOLOGY | Facility: HOSPITAL | Age: 35
Discharge: HOME | End: 2020-08-08
Attending: INTERNAL MEDICINE
Payer: COMMERCIAL

## 2020-08-08 DIAGNOSIS — M54.12 CERVICAL RADICULOPATHY: ICD-10-CM

## 2020-08-08 DIAGNOSIS — R91.1 SOLITARY LUNG NODULE: ICD-10-CM

## 2020-08-08 PROCEDURE — 71250 CT THORAX DX C-: CPT

## 2020-08-08 PROCEDURE — 72050 X-RAY EXAM NECK SPINE 4/5VWS: CPT

## 2020-08-10 ENCOUNTER — TELEPHONE (OUTPATIENT)
Dept: INTERNAL MEDICINE CLINIC | Facility: CLINIC | Age: 35
End: 2020-08-10

## 2020-08-10 NOTE — TELEPHONE ENCOUNTER
----- Message from Kari Victoria MD sent at 8/10/2020 11:17 AM EDT -----  X-ray of cervical spine okay  CALLED PT  [facility]

## 2020-08-10 NOTE — TELEPHONE ENCOUNTER
Patient returned call & 's message was given  Jayda Renteria x-ray cervical spine ok  Also asking for a call when the CT is resulted

## 2020-08-12 ENCOUNTER — TELEPHONE (OUTPATIENT)
Dept: INTERNAL MEDICINE CLINIC | Facility: CLINIC | Age: 35
End: 2020-08-12

## 2020-08-12 NOTE — TELEPHONE ENCOUNTER
----- Message from Veronica Ugalde MD sent at 8/12/2020  7:05 PM EDT -----  there are a few small lung nodules seen on the CT of the chest, no reason for concern in nonsmoker    Will follow-up in 1 year

## 2020-08-13 NOTE — TELEPHONE ENCOUNTER
Patient said she wants to speak to Dr for more information regarding her CT scan result    that's all she would say

## 2020-08-13 NOTE — TELEPHONE ENCOUNTER
Spoke with pt, did not want to set up a virtual, only had one question  She had her baby on 4/8/20, started smoking about a pack a week a month after having the baby  Stated she was very stressed due to taking care of her baby and with COVID  Should the small nodules be because she started smoking again?     [phone]

## 2020-12-29 ENCOUNTER — TELEMEDICINE (OUTPATIENT)
Dept: INTERNAL MEDICINE CLINIC | Facility: CLINIC | Age: 35
End: 2020-12-29
Payer: COMMERCIAL

## 2020-12-29 DIAGNOSIS — B34.9 VIRAL INFECTION, UNSPECIFIED: ICD-10-CM

## 2020-12-29 DIAGNOSIS — Z03.818 ENCOUNTER FOR OBSERVATION FOR SUSPECTED EXPOSURE TO OTHER BIOLOGICAL AGENTS RULED OUT: ICD-10-CM

## 2020-12-29 PROCEDURE — 99213 OFFICE O/P EST LOW 20 MIN: CPT | Performed by: NURSE PRACTITIONER

## 2020-12-31 PROCEDURE — U0003 INFECTIOUS AGENT DETECTION BY NUCLEIC ACID (DNA OR RNA); SEVERE ACUTE RESPIRATORY SYNDROME CORONAVIRUS 2 (SARS-COV-2) (CORONAVIRUS DISEASE [COVID-19]), AMPLIFIED PROBE TECHNIQUE, MAKING USE OF HIGH THROUGHPUT TECHNOLOGIES AS DESCRIBED BY CMS-2020-01-R: HCPCS | Performed by: NURSE PRACTITIONER

## 2021-01-01 LAB — SARS-COV-2 RNA SPEC QL NAA+PROBE: DETECTED

## 2021-01-05 ENCOUNTER — TELEPHONE (OUTPATIENT)
Dept: INTERNAL MEDICINE CLINIC | Facility: CLINIC | Age: 36
End: 2021-01-05

## 2021-01-05 NOTE — TELEPHONE ENCOUNTER
----- Message from Tami Omalley [place] sent at 1/5/2021  7:50 AM EST -----  Please let patient know covid is positive  Should continue quarantine for 10 days since symptom onset, can end once feeling better and no fever for 24 hours  Can make follow up appt if she desires  Thanks

## 2021-01-05 NOTE — TELEPHONE ENCOUNTER
Pt already knew her result was positive  Pt said she never had any symptoms and will stay quarantined 10 days from test date

## 2021-08-25 NOTE — DISCHARGE NOTE OB - NS OB DC IMMUNIZATIONS MMR YN
Patient walks in the office with a Proventil HFA box that he opened today from the mail order pharmacy that [employer] gets his Proventil HFA for him from and the box only contained the plastic martin but no cartridge of medication.  Writer attempted to contact PAP department but it was 5 pm and they were already closed for the day.  Advised patient that writer would have them call him tomorrow.  Also suggested that patient might want to try calling the mail order pharmacy phone number listed on the box label.  Offered to send a one month Rx to his local pharmacy but the patient declined.       Could someone from [employer] please reach out to Daryn to help him get a replacement.      No

## 2021-12-28 ENCOUNTER — TELEMEDICINE (OUTPATIENT)
Dept: INTERNAL MEDICINE CLINIC | Facility: CLINIC | Age: 36
End: 2021-12-28
Payer: COMMERCIAL

## 2021-12-28 DIAGNOSIS — J01.10 ACUTE NON-RECURRENT FRONTAL SINUSITIS: Primary | ICD-10-CM

## 2021-12-28 PROCEDURE — 99213 OFFICE O/P EST LOW 20 MIN: CPT | Performed by: NURSE PRACTITIONER

## 2021-12-28 RX ORDER — AMOXICILLIN AND CLAVULANATE POTASSIUM 875; 125 MG/1; MG/1
1 TABLET, FILM COATED ORAL EVERY 12 HOURS SCHEDULED
Qty: 20 TABLET | Refills: 0 | Status: SHIPPED | OUTPATIENT
Start: 2021-12-28 | End: 2022-01-07

## 2021-12-28 RX ORDER — METHYLPREDNISOLONE 4 MG/1
TABLET ORAL
Qty: 21 EACH | Refills: 0 | Status: SHIPPED | OUTPATIENT
Start: 2021-12-28 | End: 2022-03-03 | Stop reason: ALTCHOICE
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
## 2022-03-03 ENCOUNTER — APPOINTMENT (OUTPATIENT)
Dept: RADIOLOGY | Facility: CLINIC | Age: 37
End: 2022-03-03
Payer: COMMERCIAL

## 2022-03-03 ENCOUNTER — OFFICE VISIT (OUTPATIENT)
Dept: INTERNAL MEDICINE CLINIC | Facility: CLINIC | Age: 37
End: 2022-03-03
Payer: COMMERCIAL

## 2022-03-03 VITALS
SYSTOLIC BLOOD PRESSURE: 140 MMHG | HEIGHT: 68 IN | HEART RATE: 102 BPM | WEIGHT: 181.6 LBS | RESPIRATION RATE: 16 BRPM | DIASTOLIC BLOOD PRESSURE: 80 MMHG | BODY MASS INDEX: 27.52 KG/M2

## 2022-03-03 DIAGNOSIS — M25.512 ACUTE PAIN OF LEFT SHOULDER: ICD-10-CM

## 2022-03-03 DIAGNOSIS — R07.89 CHEST WALL PAIN: ICD-10-CM

## 2022-03-03 DIAGNOSIS — M25.512 ACUTE PAIN OF LEFT SHOULDER: Primary | ICD-10-CM

## 2022-03-03 PROBLEM — F17.210 CIGARETTE SMOKER: Status: ACTIVE | Noted: 2021-07-13

## 2022-03-03 PROBLEM — F41.8 DEPRESSION WITH ANXIETY: Status: ACTIVE | Noted: 2021-05-05

## 2022-03-03 PROCEDURE — 93000 ELECTROCARDIOGRAM COMPLETE: CPT | Performed by: NURSE PRACTITIONER

## 2022-03-03 PROCEDURE — 99214 OFFICE O/P EST MOD 30 MIN: CPT | Performed by: NURSE PRACTITIONER

## 2022-03-03 PROCEDURE — 73030 X-RAY EXAM OF SHOULDER: CPT

## 2022-03-03 PROCEDURE — 3725F SCREEN DEPRESSION PERFORMED: CPT | Performed by: NURSE PRACTITIONER

## 2022-03-03 RX ORDER — METHOCARBAMOL 750 MG/1
750 TABLET, FILM COATED ORAL EVERY 6 HOURS PRN
Qty: 20 TABLET | Refills: 0 | Status: SHIPPED | OUTPATIENT
Start: 2022-03-03

## 2022-03-03 RX ORDER — MELOXICAM 15 MG/1
15 TABLET ORAL DAILY
Qty: 20 TABLET | Refills: 0 | Status: SHIPPED | OUTPATIENT
Start: 2022-03-03

## 2022-03-03 NOTE — PROGRESS NOTES
[place]    NAME: Jose Callahan  AGE: 29 y o  SEX: female  : 1985     DATE: 3/3/2022     Assessment and Plan:     Problem List Items Addressed This Visit        Other    Acute pain of left shoulder - Primary      The patient started with acute left shoulder pain on Tuesday  Currently the pain is radiating to both showed doors, down her left arm and across her left chest wall  The patient denies any shortness of breath, diaphoresis or headaches  The patient denies any mechanism of injury  She states she feels like "her shoulder is dislocated  She does have some decreased range of motion  She does have some significant tenderness with palpation  Of the left shoulder  And scapula  An x-ray of the left shoulder has been ordered  A muscle relaxant was sent to her pharmacy  Meloxicam was also sent to her pharmacy  Stat referral to the orthopedic group was placed  Relevant Medications    methocarbamol (Robaxin-750) 750 mg tablet    meloxicam (Mobic) 15 mg tablet    Other Relevant Orders    XR shoulder 2+ vw left    Ambulatory Referral to Orthopedic Surgery    Chest wall pain       The patient with acute left chest wall pain along with her left shoulder pain  She denies any other cardiac symptoms  EKG in the office today shows sinus tach with a heart rate of 102 otherwise normal   This was reviewed  With Dr Mirna Solitario  Relevant Orders    POCT ECG (Completed)              No follow-ups on file  Chief Complaint:     Chief Complaint   Patient presents with    Shoulder Pain      pain started tuesday, both shoulders, radiating down arm   Chest Pain        History of Present Illness:    patient presents to the office today with acute left shoulder pain which radiates across her back to her right shoulder and across her left chest wall  She denies any mechanism of injury    She is having some pain down her left arm with some numbness in her hand if she sleeps on that arm  An EKG was performed in the office due to the chest wall pain which was normal   Stat x-ray of the shoulder was ordered  Meloxicam Robaxin sent to her pharmacy  Stat ortho referral also entered   Review of Systems:     Review of Systems   Constitutional: Negative for activity change, fatigue and fever  HENT: Negative for congestion, hearing loss, rhinorrhea, trouble swallowing and voice change  Eyes: Negative for photophobia, pain, discharge and visual disturbance  Respiratory: Negative for cough, chest tightness and shortness of breath  Cardiovascular: Positive for chest pain  Negative for palpitations and leg swelling  Gastrointestinal: Negative for abdominal pain, blood in stool, constipation, nausea and vomiting  Endocrine: Negative for cold intolerance and heat intolerance  Genitourinary: Negative for difficulty urinating, frequency, hematuria, urgency, vaginal bleeding and vaginal discharge  Musculoskeletal: Positive for arthralgias and back pain (left scapula)  Negative for myalgias  Bilateral shoulder pain   Skin: Negative  Neurological: Positive for numbness (left arm)  Negative for dizziness, weakness and headaches  Psychiatric/Behavioral: Negative for decreased concentration  The patient is not nervous/anxious  Problem List:     Patient Active Problem List   Diagnosis    Bicornuate uterus    Recurrent pregnancy loss    Cervical radiculopathy    Obesity (BMI 30-39  9)    Solitary lung nodule    Cigarette smoker    Depression with anxiety    Acute pain of left shoulder    Chest wall pain        Objective:     /80 (BP Location: Left arm, Patient Position: Sitting, Cuff Size: Standard)   Pulse 102   Resp 16   Ht 5' 8" (1 727 m)   Wt 82 4 kg (181 lb 9 6 oz)   BMI 27 61 kg/m²     Current Outpatient Medications   Medication Instructions    Flaxseed, Linseed, (FLAXSEED OIL PO) Oral    ibuprofen (MOTRIN) 600 mg tablet TAKE ONE TABLET BY MOUTH EVERY 6 HOURS AS NEEDED WITH FOOD    meloxicam (MOBIC) 15 mg, Oral, Daily    methocarbamol (ROBAXIN-750) 750 mg, Oral, Every 6 hours PRN         Physical Exam  Vitals reviewed  Constitutional:       Appearance: Normal appearance  HENT:      Head: Normocephalic  Nose: Nose normal       Mouth/Throat:      Mouth: Mucous membranes are moist       Pharynx: Oropharynx is clear  Eyes:      Extraocular Movements: Extraocular movements intact  Pupils: Pupils are equal, round, and reactive to light  Cardiovascular:      Rate and Rhythm: Normal rate and regular rhythm  Pulses: Normal pulses  Heart sounds: Normal heart sounds  Pulmonary:      Effort: Pulmonary effort is normal       Breath sounds: Normal breath sounds  Musculoskeletal:      Left shoulder: Tenderness and bony tenderness present  Decreased range of motion  Skin:     General: Skin is warm and dry  Neurological:      General: No focal deficit present  Mental Status: She is alert and oriented to person, place, and time  Psychiatric:         Mood and Affect: Mood normal          Behavior: Behavior normal          Thought Content:  Thought content normal          Judgment: Judgment normal          [facility], [address]

## 2022-03-03 NOTE — ASSESSMENT & PLAN NOTE
The patient with acute left chest wall pain along with her left shoulder pain  She denies any other cardiac symptoms  EKG in the office today shows sinus tach with a heart rate of 102 otherwise normal   This was reviewed  With Dr Aleksander Lockett

## 2022-03-03 NOTE — ASSESSMENT & PLAN NOTE
The patient started with acute left shoulder pain on Tuesday  Currently the pain is radiating to both showed doors, down her left arm and across her left chest wall  The patient denies any shortness of breath, diaphoresis or headaches  The patient denies any mechanism of injury  She states she feels like "her shoulder is dislocated  She does have some decreased range of motion  She does have some significant tenderness with palpation  Of the left shoulder  And scapula  An x-ray of the left shoulder has been ordered  A muscle relaxant was sent to her pharmacy  Meloxicam was also sent to her pharmacy  Stat referral to the orthopedic group was placed

## 2022-03-04 ENCOUNTER — TELEPHONE (OUTPATIENT)
Dept: INTERNAL MEDICINE CLINIC | Facility: CLINIC | Age: 37
End: 2022-03-04

## 2022-03-04 NOTE — TELEPHONE ENCOUNTER
----- Message from Jordon Patricio, [address]  sent at 3/4/2022  8:03 AM EST -----   Please let the patient know that her x-ray is normal   She should call Orthopedics and make an appointment as soon as possible if the pain continues

## 2022-03-18 DIAGNOSIS — M25.511 CHRONIC RIGHT SHOULDER PAIN: Primary | ICD-10-CM

## 2022-03-18 DIAGNOSIS — G89.29 CHRONIC RIGHT SHOULDER PAIN: Primary | ICD-10-CM

## 2022-03-22 ENCOUNTER — OFFICE VISIT (OUTPATIENT)
Dept: OBGYN CLINIC | Facility: CLINIC | Age: 37
End: 2022-03-22
Payer: COMMERCIAL

## 2022-03-22 ENCOUNTER — APPOINTMENT (OUTPATIENT)
Dept: RADIOLOGY | Facility: CLINIC | Age: 37
End: 2022-03-22
Payer: COMMERCIAL

## 2022-03-22 VITALS
HEART RATE: 77 BPM | DIASTOLIC BLOOD PRESSURE: 82 MMHG | SYSTOLIC BLOOD PRESSURE: 139 MMHG | HEIGHT: 68 IN | BODY MASS INDEX: 27.55 KG/M2 | WEIGHT: 181.8 LBS

## 2022-03-22 DIAGNOSIS — M25.512 ACUTE PAIN OF LEFT SHOULDER: ICD-10-CM

## 2022-03-22 DIAGNOSIS — S46.819A STRAIN OF TRAPEZIUS MUSCLE, UNSPECIFIED LATERALITY, INITIAL ENCOUNTER: Primary | ICD-10-CM

## 2022-03-22 DIAGNOSIS — G89.29 CHRONIC RIGHT SHOULDER PAIN: ICD-10-CM

## 2022-03-22 DIAGNOSIS — M25.511 CHRONIC RIGHT SHOULDER PAIN: ICD-10-CM

## 2022-03-22 PROCEDURE — 99203 OFFICE O/P NEW LOW 30 MIN: CPT | Performed by: ORTHOPAEDIC SURGERY

## 2022-03-22 PROCEDURE — 73030 X-RAY EXAM OF SHOULDER: CPT

## 2022-03-22 PROCEDURE — 3008F BODY MASS INDEX DOCD: CPT | Performed by: ORTHOPAEDIC SURGERY

## 2022-03-22 RX ORDER — MELOXICAM 15 MG/1
15 TABLET ORAL DAILY
Qty: 30 TABLET | Refills: 0 | Status: SHIPPED | OUTPATIENT
Start: 2022-03-22

## 2022-03-22 NOTE — PROGRESS NOTES
Orthopaedics Office Visit - 1st Patient Visit    ASSESSMENT/PLAN:    Assessment:   Bilateral shoulder pain   Bilateral rotator cuff impingement   Trapezius muscle spasm  Scapulothoracic dysfunction       Plan:   - Discussed conservative treatment with patient at length  - Begin physical therapy as directed   - Begin mobic as directed   - Over the counter analgesics as needed / directed   - Ice / heat as directed   - Discussed possibility of referral to spine and pain pending symptoms next visit       To Do Next Visit:  Evaluate bilateral shoulder     _____________________________________________________  CHIEF COMPLAINT:  Chief Complaint   Patient presents with    Right Shoulder - Pain    Left Shoulder - Pain         SUBJECTIVE:  Alana Holguin is a 29 y o  female who presents to the office for an initial evaluation for her bilateral shoulder pain  Patient states she has been experiencing shoulder pain for the past 10 years in duration but states that over the past year her shoulder pain has become worse  Patient denies any past treatment for the shoulder pain  Patient denies any true history of injuries to the shoulder  Patient states she was seen and evaluated by primary care doctor 2 weeks ago we ordered an x-ray of the left shoulder and was advised follow-up with Orthopedics  Patient does have a past medical history cervical radiculopathy treated with Dr Eduardo Abbott approximately 2 years ago  Patient states she has been applying heating pad and taking Motrin as needed for pain with mild relief of symptoms  Patient states that when her pain becomes severe does go down the entire arm does experience numbness and tingling in both of her hands  Patient offers no other complaints at this time      PAST MEDICAL HISTORY:  Past Medical History:   Diagnosis Date    , spontaneous complete     Cigarette nicotine dependence without complication     Missed  2016    Transitioned From: , spontaneous threatened    Tubal pregnancy        PAST SURGICAL HISTORY:  Past Surgical History:   Procedure Laterality Date     SECTION      INDUCED       LAPAROSCOPY      TONSILLECTOMY         FAMILY HISTORY:  Family History   Problem Relation Age of Onset    Thyroid disease Mother        SOCIAL HISTORY:  Social History     Tobacco Use    Smoking status: Current Some Day Smoker     Packs/day: 1      Years: 10 00     Pack years: 10 00     Types: Cigarettes     Last attempt to quit: 2019     Years since quittin 8    Smokeless tobacco: Never Used   Vaping Use    Vaping Use: Never used   Substance Use Topics    Alcohol use: Yes     Comment: Social    Drug use: No       MEDICATIONS:    Current Outpatient Medications:     Flaxseed, Linseed, (FLAXSEED OIL PO), Take by mouth, Disp: , Rfl:     ibuprofen (MOTRIN) 600 mg tablet, TAKE ONE TABLET BY MOUTH EVERY 6 HOURS AS NEEDED WITH FOOD, Disp: , Rfl: 0    meloxicam (Mobic) 15 mg tablet, Take 1 tablet (15 mg total) by mouth daily, Disp: 20 tablet, Rfl: 0    methocarbamol (Robaxin-750) 750 mg tablet, Take 1 tablet (750 mg total) by mouth every 6 (six) hours as needed for muscle spasms, Disp: 20 tablet, Rfl: 0    ALLERGIES:  No Known Allergies    REVIEW OF SYSTEMS:  MSK:  Bilateral shoulder pain  Neuro:  Numbness and tingling hands  Pertinent items are otherwise noted in HPI  A comprehensive review of systems was otherwise negative      LABS:  HgA1c: No results found for: HGBA1C  BMP: No results found for: GLUCOSE, CALCIUM, NA, K, CO2, CL, BUN, CREATININE  CBC: No components found for: CBC    _____________________________________________________  PHYSICAL EXAMINATION:  Vital signs: /82   Pulse 77   Ht 5' 8" (1 727 m)   Wt 82 5 kg (181 lb 12 8 oz)   BMI 27 64 kg/m²   General: No acute distress, awake and alert  Psychiatric: Mood and affect appear appropriate  HEENT: Trachea Midline, No torticollis, no apparent facial trauma  Cardiovascular: No audible murmurs; Extremities appear perfused  Pulmonary: No audible wheezing or stridor  Skin: No open lesions; see further details (if any) below      MUSCULOSKELETAL EXAMINATION:  Bilateral shoulder examination:  - Patient sitting comfortably in the office in no acute distress   - no acute visible abnormalities present in the shoulders  Extremities appear to be well perfused overall   - tenderness palpation noted over the mid trapezial muscle groups bilaterally  No other bony or soft tissue tenderness palpation noted at this time  - full range of motion of the shoulders noted with no pain  - 5/5 strength noted throughout the upper extremity muscle groups bilaterally  - Special Tests       -  + Carlos  - NV intact    _____________________________________________________  STUDIES REVIEWED:  I personally reviewed the images and interpretation is as follows:  Bilateral shoulder XR 2 views:  No acute osseous abnormalities present  PROCEDURES PERFORMED:  No procedures were performed at this time  Edmond Mayfield PA-C - assisting    Jigar Ward MD            Portions of the record may have been created with voice recognition software  Occasional wrong word or "sound a like" substitutions may have occurred due to the inherent limitations of voice recognition software  Read the chart carefully and recognize, using context, where substitutions have occurred

## 2022-11-16 ENCOUNTER — OFFICE VISIT (OUTPATIENT)
Dept: INTERNAL MEDICINE CLINIC | Facility: CLINIC | Age: 37
End: 2022-11-16

## 2022-11-16 VITALS
SYSTOLIC BLOOD PRESSURE: 128 MMHG | WEIGHT: 178.8 LBS | RESPIRATION RATE: 18 BRPM | TEMPERATURE: 97.5 F | HEART RATE: 83 BPM | OXYGEN SATURATION: 96 % | DIASTOLIC BLOOD PRESSURE: 82 MMHG | BODY MASS INDEX: 27.1 KG/M2 | HEIGHT: 68 IN

## 2022-11-16 DIAGNOSIS — R05.1 ACUTE COUGH: ICD-10-CM

## 2022-11-16 DIAGNOSIS — J02.9 PHARYNGITIS, UNSPECIFIED ETIOLOGY: Primary | ICD-10-CM

## 2022-11-16 DIAGNOSIS — Z11.52 ENCOUNTER FOR SCREENING FOR COVID-19: ICD-10-CM

## 2022-11-16 LAB
S PYO AG THROAT QL: NEGATIVE
SARS-COV-2 AG UPPER RESP QL IA: NEGATIVE
VALID CONTROL: NORMAL

## 2022-11-16 RX ORDER — LIDOCAINE HYDROCHLORIDE 20 MG/ML
15 SOLUTION OROPHARYNGEAL 4 TIMES DAILY PRN
Qty: 100 ML | Refills: 0 | Status: SHIPPED | OUTPATIENT
Start: 2022-11-16

## 2022-11-16 RX ORDER — FLUTICASONE PROPIONATE 50 MCG
1 SPRAY, SUSPENSION (ML) NASAL DAILY
Qty: 15.8 ML | Refills: 1 | Status: SHIPPED | OUTPATIENT
Start: 2022-11-16

## 2022-11-16 NOTE — PROGRESS NOTES
Name: Cherry Laureano      : 1985      MRN: [id number]  Encounter Provider: KASANDRA Pavon  Encounter Date: 2022   Enc[facility]: [facility]   [address]     1  Pharyngitis, unspecified etiology  Comments:  "razor sharp sore throat" x 5days  Positive sick contact from her son's school  Rule out strep  Patient will contact the office for worsening symptoms  Orders:  -     POCT rapid strepA  -     Streptococcus, Group A Culture; Future  -     Streptococcus, Group A Culture  -     Throat culture; Future  -     Lidocaine Viscous HCl (XYLOCAINE) 2 % mucosal solution; Swish and spit 15 mL 4 (four) times a day as needed for mouth pain or discomfort    2  Encounter for screening for COVID-19  -     POCT Rapid Covid Ag    3  Acute cough  Comments:  Postnasal drip and cough x5 days -COVID negative  Orders:  -     fluticasone (FLONASE) 50 mcg/act nasal spray; 1 spray into each nostril daily  -     dextromethorphan-guaifenesin (MUCINEX DM)  MG per 12 hr tablet; Take 1 tablet by mouth every 12 (twelve) hours           Subjective      Sore Throat   This is a new problem  The current episode started in the past 7 days  The problem has been gradually improving  Neither side of throat is experiencing more pain than the other  There has been no fever  The pain is at a severity of 10/10  The pain is severe  Associated symptoms include congestion, coughing, ear pain, headaches, a hoarse voice, swollen glands and trouble swallowing  Pertinent negatives include no abdominal pain, diarrhea, neck pain, shortness of breath or vomiting  Exposure to: unknown  She has tried NSAIDs and gargles for the symptoms  The treatment provided mild relief  Review of Systems   Constitutional: Negative  HENT: Positive for congestion, ear pain, hoarse voice, postnasal drip, sore throat, trouble swallowing and voice change  Negative for sinus pressure and sinus pain      Eyes: Negative  Respiratory: Positive for cough  Negative for shortness of breath  Cardiovascular: Negative  Gastrointestinal: Negative for abdominal pain, diarrhea and vomiting  Musculoskeletal: Negative for neck pain  Allergic/Immunologic: Negative for environmental allergies  Neurological: Positive for headaches  Hematological: Positive for adenopathy  Psychiatric/Behavioral: Negative  Current Outpatient Medications on File Prior to Visit   Medication Sig   • Flaxseed, Linseed, (FLAXSEED OIL PO) Take by mouth   • ibuprofen (MOTRIN) 600 mg tablet TAKE ONE TABLET BY MOUTH EVERY 6 HOURS AS NEEDED WITH FOOD   • meloxicam (Mobic) 15 mg tablet Take 1 tablet (15 mg total) by mouth daily (Patient taking differently: Take 15 mg by mouth if needed)   • meloxicam (Mobic) 15 mg tablet Take 1 tablet (15 mg total) by mouth daily   • methocarbamol (Robaxin-750) 750 mg tablet Take 1 tablet (750 mg total) by mouth every 6 (six) hours as needed for muscle spasms (Patient not taking: Reported on 11/16/2022)       Objective     /82 (BP Location: Left arm, Patient Position: Sitting, Cuff Size: Standard)   Pulse 83   Temp 97 5 °F (36 4 °C) (Temporal)   Resp 18   Ht 5' 8" (1 727 m)   Wt 81 1 kg (178 lb 12 8 oz)   SpO2 96%   Breastfeeding Unknown   BMI 27 19 kg/m²     Physical Exam  Constitutional:       Appearance: Normal appearance  HENT:      Right Ear: Tympanic membrane normal       Left Ear: Tympanic membrane normal       Nose: Congestion and rhinorrhea present  Rhinorrhea is clear  Right Turbinates: Enlarged  Left Turbinates: Enlarged  Right Sinus: No maxillary sinus tenderness or frontal sinus tenderness  Left Sinus: No maxillary sinus tenderness or frontal sinus tenderness  Mouth/Throat:      Pharynx: Posterior oropharyngeal erythema present  No oropharyngeal exudate     Eyes:      Conjunctiva/sclera: Conjunctivae normal    Cardiovascular:      Rate and Rhythm: Normal rate       Pulses: Normal pulses  Heart sounds: Normal heart sounds  Pulmonary:      Effort: Pulmonary effort is normal       Breath sounds: Normal breath sounds  No wheezing  Abdominal:      Palpations: Abdomen is soft  Musculoskeletal:         General: Normal range of motion  Cervical back: Normal range of motion and neck supple  Lymphadenopathy:      Cervical: No cervical adenopathy  Skin:     General: Skin is warm and dry  Neurological:      General: No focal deficit present  Mental Status: She is alert and oriented to person, place, and time         Nehemias Pemberton, [address] [place] [address] St

## 2022-11-18 LAB — BACTERIA THROAT CULT: NORMAL

## 2023-03-06 ENCOUNTER — OFFICE VISIT (OUTPATIENT)
Dept: INTERNAL MEDICINE CLINIC | Facility: CLINIC | Age: 38
End: 2023-03-06

## 2023-03-06 VITALS
WEIGHT: 179.4 LBS | OXYGEN SATURATION: 94 % | BODY MASS INDEX: 27.28 KG/M2 | DIASTOLIC BLOOD PRESSURE: 80 MMHG | RESPIRATION RATE: 18 BRPM | TEMPERATURE: 97.8 F | HEART RATE: 93 BPM | SYSTOLIC BLOOD PRESSURE: 114 MMHG

## 2023-03-06 DIAGNOSIS — H10.9 BACTERIAL CONJUNCTIVITIS OF LEFT EYE: Primary | ICD-10-CM

## 2023-03-06 RX ORDER — POLYMYXIN B SULFATE AND TRIMETHOPRIM 1; 10000 MG/ML; [USP'U]/ML
1 SOLUTION OPHTHALMIC EVERY 6 HOURS
Qty: 10 ML | Refills: 0 | Status: SHIPPED | OUTPATIENT
Start: 2023-03-06

## 2023-03-06 NOTE — PROGRESS NOTES
Name: Dalia Ruth      : 1985      MRN: [id number]  Encounter Provider: Connie Villalta DO  Encounter Date: 3/6/2023   Encounter department: [facility]   [address]     1  Bacterial conjunctivitis of left eye- will tx with therapy noted below  -     polymyxin b-trimethoprim (POLYTRIM) ophthalmic solution; Administer 1 drop into the left eye every 6 (six) hours           Subjective      Onset last night  Started with pain, mild itch and little bit of discharge  Some fuzzy vision last night while driving  Normal visit since  Eye crusted shut this morning  Discharge continues  Yellow and thick  Doesn't wear contacts  denies trauma to eye  Review of Systems   Constitutional: Negative for fever  Eyes: Positive for discharge and redness  Negative for photophobia, pain and itching         Current Outpatient Medications on File Prior to Visit   Medication Sig   • dextromethorphan-guaifenesin (MUCINEX DM)  MG per 12 hr tablet Take 1 tablet by mouth every 12 (twelve) hours   • Flaxseed, Linseed, (FLAXSEED OIL PO) Take by mouth   • fluticasone (FLONASE) 50 mcg/act nasal spray 1 spray into each nostril daily   • ibuprofen (MOTRIN) 600 mg tablet TAKE ONE TABLET BY MOUTH EVERY 6 HOURS AS NEEDED WITH FOOD   • Lidocaine Viscous HCl (XYLOCAINE) 2 % mucosal solution Swish and spit 15 mL 4 (four) times a day as needed for mouth pain or discomfort   • meloxicam (Mobic) 15 mg tablet Take 1 tablet (15 mg total) by mouth daily (Patient taking differently: Take 15 mg by mouth if needed)   • meloxicam (Mobic) 15 mg tablet Take 1 tablet (15 mg total) by mouth daily   • methocarbamol (Robaxin-750) 750 mg tablet Take 1 tablet (750 mg total) by mouth every 6 (six) hours as needed for muscle spasms (Patient not taking: Reported on 2022)       Objective     /80 (BP Location: Left arm, Patient Position: Sitting, Cuff Size: Standard)   Pulse 93   Temp 97 8 °F (36 6 °C) (Temporal)   Resp 18   Wt 81 4 kg (179 lb 6 4 oz)   SpO2 94%   BMI 27 28 kg/m²     Physical Exam  HENT:      Head: Normocephalic  Eyes:      General:         Right eye: Discharge (thick, yellow ) present  Extraocular Movements: Extraocular movements intact  Conjunctiva/sclera:      Right eye: Right conjunctiva is injected  Pupils: Pupils are equal, round, and reactive to light  Comments: No pain with ocular movement    Cardiovascular:      Rate and Rhythm: Normal rate  Neurological:      Mental Status: She is alert         Kendall Binder, DO

## 2023-04-05 PROBLEM — Z67.91 BLOOD TYPE, RH NEGATIVE: Status: ACTIVE | Noted: 2017-11-08

## 2024-07-17 ENCOUNTER — APPOINTMENT (OUTPATIENT)
Dept: OBGYN | Facility: CLINIC | Age: 39
End: 2024-07-17
Payer: COMMERCIAL

## 2024-07-17 VITALS
HEIGHT: 68 IN | WEIGHT: 179 LBS | BODY MASS INDEX: 27.13 KG/M2 | DIASTOLIC BLOOD PRESSURE: 68 MMHG | SYSTOLIC BLOOD PRESSURE: 110 MMHG

## 2024-07-17 DIAGNOSIS — D25.9 LEIOMYOMA OF UTERUS, UNSPECIFIED: ICD-10-CM

## 2024-07-17 DIAGNOSIS — R10.2 PELVIC AND PERINEAL PAIN: ICD-10-CM

## 2024-07-17 DIAGNOSIS — Z87.718 PERSONAL HISTORY OF OTHER SPECIFIED (CORRECTED) CONGENITAL MALFORMATIONS OF GENITOURINARY SYSTEM: ICD-10-CM

## 2024-07-17 DIAGNOSIS — Q51.3 BICORNATE UTERUS: ICD-10-CM

## 2024-07-17 PROCEDURE — 99203 OFFICE O/P NEW LOW 30 MIN: CPT

## 2024-07-17 PROCEDURE — G2211 COMPLEX E/M VISIT ADD ON: CPT | Mod: NC

## 2024-10-08 ENCOUNTER — OFFICE VISIT (OUTPATIENT)
Age: 39
End: 2024-10-08
Payer: COMMERCIAL

## 2024-10-08 VITALS
HEART RATE: 110 BPM | SYSTOLIC BLOOD PRESSURE: 112 MMHG | TEMPERATURE: 99.4 F | OXYGEN SATURATION: 98 % | WEIGHT: 184.4 LBS | HEIGHT: 68 IN | BODY MASS INDEX: 27.95 KG/M2 | DIASTOLIC BLOOD PRESSURE: 80 MMHG

## 2024-10-08 DIAGNOSIS — J02.0 STREP PHARYNGITIS: Primary | ICD-10-CM

## 2024-10-08 DIAGNOSIS — J02.9 SORE THROAT: ICD-10-CM

## 2024-10-08 LAB
S PYO AG THROAT QL: POSITIVE
SARS-COV-2 AG UPPER RESP QL IA: NEGATIVE
VALID CONTROL: NORMAL

## 2024-10-08 PROCEDURE — 87811 SARS-COV-2 COVID19 W/OPTIC: CPT

## 2024-10-08 PROCEDURE — 87880 STREP A ASSAY W/OPTIC: CPT

## 2024-10-08 PROCEDURE — 99213 OFFICE O/P EST LOW 20 MIN: CPT

## 2024-10-08 RX ORDER — AMOXICILLIN 500 MG/1
500 TABLET, FILM COATED ORAL 2 TIMES DAILY
Qty: 20 TABLET | Refills: 0 | Status: SHIPPED | OUTPATIENT
Start: 2024-10-08 | End: 2024-10-18

## 2024-10-08 RX ORDER — AMOXICILLIN 875 MG
875 TABLET ORAL 2 TIMES DAILY
Qty: 10 TABLET | Refills: 0 | Status: CANCELLED | OUTPATIENT
Start: 2024-10-08 | End: 2024-10-13

## 2024-10-08 NOTE — PROGRESS NOTES
Ambulatory Visit  Name: Latasha Lopez      : 1985      MRN: [id number]  Encounter Provider: Ban Layton PA-C  Encounter Date: 10/8/2024   Encounter department: [facility]    Assessment & Plan  Strep pharyngitis  Rapid strep test positive. Will treat wth 10 day course of amoxicillin. Recommend she take it with food to avoid stomach upset. Recommend salt water gargles, stay well hydrated, and use OTC pain medications like ibuprofen or acetaminophen as needed to reduce pain and fever. Follow up if symptoms worsen or fail to improve.   Orders:    amoxicillin (AMOXIL) 500 MG tablet; Take 1 tablet (500 mg total) by mouth 2 (two) times a day for 10 days    Sore throat  See plan above.   Orders:    POCT Rapid Covid Ag    POCT rapid ANTIGEN strepA       History of Present Illness     Latasha is a 38-year-old female presenting to the office with complaint of sore throat and lymph node swelling for the past 1 day.  Yesterday she had a fever of 101.4 °F.  She is also having bilateral ear pain.  No cough, no known sick contacts.  No chest pain or shortness of breath.  She has been having difficulty eating and drinking secondary to sore throat.            Review of Systems   Constitutional:  Positive for fever. Negative for chills.   HENT:  Positive for ear pain and sore throat. Negative for congestion and ear discharge.    Eyes:  Negative for pain and visual disturbance.   Respiratory:  Negative for cough and shortness of breath.    Cardiovascular:  Negative for chest pain and palpitations.   Gastrointestinal:  Negative for abdominal pain, constipation, diarrhea and vomiting.   Genitourinary:  Negative for dysuria and hematuria.   Musculoskeletal:  Negative for arthralgias, back pain and myalgias.   Skin:  Negative for color change and rash.   Neurological:  Negative for dizziness, seizures, syncope and headaches.   All other systems reviewed and are negative.          Objective     BP  "112/80   Pulse (!) 110   Temp 99.4 °F (37.4 °C) (Tympanic)   Ht 5' 8\" (1.727 m)   Wt 83.6 kg (184 lb 6.4 oz)   SpO2 98%   BMI 28.04 kg/m²     Physical Exam  Vitals and nursing note reviewed.   Constitutional:       General: She is not in acute distress.     Appearance: She is well-developed.   HENT:      Head: Normocephalic and atraumatic.      Right Ear: Tympanic membrane normal. No drainage or swelling. No middle ear effusion. Tympanic membrane is not erythematous, retracted or bulging.      Left Ear: Tympanic membrane normal. No drainage or swelling.  No middle ear effusion. Tympanic membrane is not erythematous, retracted or bulging.      Nose: Nose normal.      Mouth/Throat:      Mouth: Mucous membranes are moist.      Pharynx: Oropharynx is clear. No oropharyngeal exudate.   Eyes:      Extraocular Movements: Extraocular movements intact.      Conjunctiva/sclera: Conjunctivae normal.   Cardiovascular:      Rate and Rhythm: Normal rate and regular rhythm.      Heart sounds: Normal heart sounds. No murmur heard.     No friction rub. No gallop.   Pulmonary:      Effort: Pulmonary effort is normal. No respiratory distress.      Breath sounds: Normal breath sounds. No wheezing, rhonchi or rales.   Abdominal:      Palpations: Abdomen is soft.      Tenderness: There is no abdominal tenderness.   Musculoskeletal:         General: No swelling.      Cervical back: Neck supple.   Lymphadenopathy:      Cervical: Cervical adenopathy present.      Right cervical: Superficial cervical adenopathy present. No posterior cervical adenopathy.     Left cervical: No superficial or posterior cervical adenopathy.   Skin:     General: Skin is warm and dry.      Capillary Refill: Capillary refill takes less than 2 seconds.   Neurological:      General: No focal deficit present.      Mental Status: She is alert.   Psychiatric:         Mood and Affect: Mood normal.         "

## 2025-07-29 ENCOUNTER — APPOINTMENT (OUTPATIENT)
Dept: RADIOLOGY | Facility: CLINIC | Age: 40
End: 2025-07-29
Attending: FAMILY MEDICINE
Payer: COMMERCIAL

## 2025-07-29 ENCOUNTER — OFFICE VISIT (OUTPATIENT)
Dept: OBGYN CLINIC | Facility: CLINIC | Age: 40
End: 2025-07-29
Payer: COMMERCIAL

## 2025-07-29 VITALS — WEIGHT: 191.6 LBS | BODY MASS INDEX: 29.04 KG/M2 | HEIGHT: 68 IN

## 2025-07-29 DIAGNOSIS — M51.360 DEGENERATION OF INTERVERTEBRAL DISC OF LUMBAR REGION WITH DISCOGENIC BACK PAIN: Primary | ICD-10-CM

## 2025-07-29 DIAGNOSIS — M47.816 LUMBAR FACET ARTHROPATHY: ICD-10-CM

## 2025-07-29 DIAGNOSIS — M53.3 SI (SACROILIAC) JOINT DYSFUNCTION: ICD-10-CM

## 2025-07-29 DIAGNOSIS — R19.8 ABDOMINAL WEAKNESS: ICD-10-CM

## 2025-07-29 DIAGNOSIS — M54.50 LOW BACK PAIN, UNSPECIFIED BACK PAIN LATERALITY, UNSPECIFIED CHRONICITY, UNSPECIFIED WHETHER SCIATICA PRESENT: ICD-10-CM

## 2025-07-29 PROCEDURE — 72100 X-RAY EXAM L-S SPINE 2/3 VWS: CPT

## 2025-07-29 PROCEDURE — 99203 OFFICE O/P NEW LOW 30 MIN: CPT | Performed by: FAMILY MEDICINE
